# Patient Record
Sex: FEMALE | Race: WHITE | NOT HISPANIC OR LATINO | Employment: PART TIME | URBAN - METROPOLITAN AREA
[De-identification: names, ages, dates, MRNs, and addresses within clinical notes are randomized per-mention and may not be internally consistent; named-entity substitution may affect disease eponyms.]

---
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
## 2025-02-16 NOTE — PROGRESS NOTES
Chief Complaint  Establish Care, Anxiety (And lehman swings ), and Diabetes    Subjective            Samantha Recinos is a 19 y.o. female who presents to [facility]   History of Present Illness  New patient/establish care.     She has maturity-onset diabetes mellitus and young, states this came on during her puberty.  She was diagnosed when she was 11 years old.  She is on insulin.  She is now just on a sliding scale NovoLog insulin per her carb ratios.  She is also taking glipizide 10 mg daily.  Her last A1c was 8.9% a few months ago, was checked at a [facility] facility.  She states her blood sugars usually range between 120-160.  She states that she gets her eye exams at [id number] in [place] and will be due for her eye exam in May.    She recently had twins 6 months ago.  She states that she tried OCPs but they made her periods worse.  She states that they use condoms for pregnancy prevention.  She states her last menstrual period was on 1/27/2025.    ASIYA score is 21, PHQ-2 score is 1.  She states that she has some anger issues and feels angry often.  She also complains of anxiety.  She states she has been getting worse lately and her family wanted her to get some medication.  She states she is always felt like she has had anger issues.  She states she has never been treated for anxiety or anger.    PHQ-2 Depression Screening  Little interest or pleasure in doing things? Not at all   Feeling down, depressed, or hopeless? Several days   PHQ-2 Total Score 1           Tobacco Use: Low Risk  (2/17/2025)    Patient History     Smoking Tobacco Use: Never     Smokeless Tobacco Use: Never     Passive Exposure: Never      E-cigarette/Vaping    E-cigarette/Vaping Use Never User     Passive Exposure No     Counseling Given No      E-cigarette/Vaping Substances    Nicotine No     THC No     CBD No     Flavoring No      E-cigarette/Vaping Devices    Disposable No     Pre-filled or  "Refillable Cartridge No     Refillable Tank No     Pre-filled Pod No        Alcohol Use: Not At Risk (8/5/2024)    AUDIT-C     Frequency of Alcohol Consumption: Never     Average Number of Drinks: Patient does not drink     Frequency of Binge Drinking: Never         Objective   Vital Signs:   Vitals:    02/17/25 1310   BP: 127/65   BP Location: Left arm   Patient Position: Sitting   Cuff Size: Adult   Pulse: 79   Temp: 97.9 °F (36.6 °C)   SpO2: 98%   Weight: 82.4 kg (181 lb 11.2 oz)   Height: 157.5 cm (62.01\")   PainSc: 0-No pain     Body mass index is 33.22 kg/m².    Wt Readings from Last 3 Encounters:   02/17/25 82.4 kg (181 lb 11.2 oz) (95%, Z= 1.66)*   09/13/24 66.6 kg (146 lb 12.8 oz) (80%, Z= 0.84)*   08/21/24 72.6 kg (160 lb) (89%, Z= 1.22)*     * Growth percentiles are based on CDC (Girls, 2-20 Years) data.     BP Readings from Last 3 Encounters:   02/17/25 127/65   09/13/24 121/87   08/21/24 112/77       Health Maintenance   Topic Date Due    BMI FOLLOWUP  Never done    Pneumococcal Vaccine 0-49 (1 of 1 - PPSV23) 12/20/2011    DIABETIC EYE EXAM  Never done    URINE MICROALBUMIN-CREATININE RATIO (uACR)  Never done    ANNUAL PHYSICAL  Never done    INFLUENZA VACCINE  03/31/2025 (Originally 7/1/2024)    COVID-19 Vaccine (1 - 2024-25 season) 02/17/2026 (Originally 9/1/2024)    HPV VACCINES (2 - 2-dose series) 02/17/2026 (Originally 7/16/2019)    DIABETIC FOOT EXAM  05/15/2025    CHLAMYDIA SCREENING  05/23/2025    HEMOGLOBIN A1C  06/18/2025    TDAP/TD VACCINES (2 - Td or Tdap) 08/01/2034    HEPATITIS C SCREENING  Completed    Hepatitis B  Completed    MENINGOCOCCAL VACCINE  Aged Out       /65 (BP Location: Left arm, Patient Position: Sitting, Cuff Size: Adult)   Pulse 79   Temp 97.9 °F (36.6 °C)   Ht 157.5 cm (62.01\")   Wt 82.4 kg (181 lb 11.2 oz)   SpO2 98%   BMI 33.22 kg/m²       Current Outpatient Medications:     acetone, urine, test strip, Check urine ketones when BG level is > 200 x 2 or when " sick., Disp: , Rfl:     Continuous Glucose Sensor (Dexcom G7 Sensor) misc, Use 1 Application Every 10 (Ten) Days. Apply new sensor every 10 days., Disp: 3 each, Rfl: 6    glipizide (GLUCOTROL) 10 MG tablet, Take 1 tablet by mouth Daily., Disp: , Rfl:     insulin aspart (novoLOG FLEXPEN) 100 UNIT/ML solution pen-injector sc pen, Variable dose up to 100 units per day; Insulin to carb ratio 1:8 with breakfast, lunch and dinner, Disp: 30 mL, Rfl: 5    Insulin Pen Needle 31G X 5 MM misc, For injecting insulin up to 5x a day, Disp: 250 each, Rfl: 9    OneTouch Verio test strip, Use to check blood sugar 7 times a day., Disp: 200 each, Rfl: 8    FLUoxetine (PROzac) 10 MG capsule, Take 1 capsule by mouth Daily. Indications: Generalized Anxiety Disorder, Disp: 30 capsule, Rfl: 0   Past Medical History:   Diagnosis Date    Chlamydia infection during pregnancy 2024    Diabetes     CLAUDIA    Monogenic diabetes 2024    CLAUDIA - managed by [facility] outside of pregnancy          delivery 2024     labor, Di Di twins, 30+5        Physical Exam  Vitals reviewed.   Constitutional:       Appearance: Normal appearance. She is well-developed. She is obese.   Neck:      Thyroid: No thyroid mass, thyromegaly or thyroid tenderness.   Cardiovascular:      Rate and Rhythm: Normal rate and regular rhythm.      Heart sounds: No murmur heard.     No friction rub. No gallop.   Pulmonary:      Effort: Pulmonary effort is normal.      Breath sounds: Normal breath sounds. No wheezing or rhonchi.   Lymphadenopathy:      Cervical: No cervical adenopathy.   Skin:     General: Skin is warm and dry.   Neurological:      Mental Status: She is alert and oriented to person, place, and time.      Cranial Nerves: No cranial nerve deficit.   Psychiatric:         Mood and Affect: Mood and affect normal.         Behavior: Behavior normal.         Thought Content: Thought content normal. Thought content does not include  homicidal or suicidal ideation.         Judgment: Judgment normal.          Result Review :    The following data was reviewed by: SAM Cid on 02/17/2025:  Lab Results (last 24 hours)       Procedure Component Value Units Date/Time    Microalbumin / Creatinine Urine Ratio - Urine, Clean Catch [[id number]] Collected: 02/17/25 1345    Specimen: Urine, Clean Catch Updated: 02/17/25 1348    Vitamin D,25-Hydroxy [[id number]] Collected: 02/17/25 1350    Specimen: Blood from Arm, Left Updated: 02/17/25 1355    CBC Auto Differential [[id number]] Collected: 02/17/25 1350    Specimen: Blood Updated: 02/17/25 1355    Comprehensive Metabolic Panel [[id number]] Collected: 02/17/25 1350    Specimen: Blood Updated: 02/17/25 1355    Lipid Panel [[id number]] Collected: 02/17/25 1350    Specimen: Blood Updated: 02/17/25 1355    TSH+Free T4 [[id number]] Collected: 02/17/25 1350    Specimen: Blood Updated: 02/17/25 1355    Thyroid Peroxidase Antibody [[id number]] Collected: 02/17/25 1350    Specimen: Blood Updated: 02/17/25 1355    Hemoglobin A1c [[id number]] Collected: 02/17/25 1350    Specimen: Blood Updated: 02/17/25 1355    POCT pregnancy, urine [[id number]]  (Abnormal) Collected: 02/17/25 1354    Specimen: Urine Updated: 02/17/25 1354     HCG, Urine, QL Negative     Lot Number [id number]     Internal Positive Control Passed     Internal Negative Control Passed     Expiration Date 2/3/25            Common Labs   Common labs          7/30/2024    11:20 8/5/2024    00:10 9/13/2024    14:27   Common Labs   Glucose  110     BUN  8     Creatinine  0.64     Sodium  139     Potassium  4.3     Chloride  106     Calcium  9.1     Albumin  3.1     Total Bilirubin  <0.2     Alkaline Phosphatase  131     AST (SGOT)  45     ALT (SGPT)  72     WBC 10.39     8.74  6.18    Hemoglobin 8.3     9.7  12.1    Hematocrit 25.9     31.0  39.7    Platelets 238     218  322       Details          This result is from an external source.              [id number]   A1C Last 3 Results          3/26/2024    14:46   HGBA1C Last 3 Results   Hemoglobin A1C 6.60          Assessment & Plan  CLAUDIA (maturity onset diabetes mellitus in young)  Diabetes is newly identified.   Continue current treatment regimen.  Reminded to bring in blood sugar diary at next visit.  Reminded to get yearly retinal exam.  Endocrinology clinic referral.  Diabetes will be reassessed  at next routine follow-up.    Orders:    Ambulatory Referral to [facility] - [facility]    Microalbumin / Creatinine Urine Ratio - Urine, Clean Catch    CBC Auto Differential    Comprehensive Metabolic Panel    Lipid Panel    TSH+Free T4    Thyroid Peroxidase Antibody    Hemoglobin A1c    Class 1 obesity with serious comorbidity and body mass index (BMI) of 33.0 to 33.9 in adult, unspecified obesity type  Patient's (Body mass index is 33.22 kg/m².) indicates that they are obese (BMI >30) with health conditions that include diabetes mellitus . Weight is newly identified. BMI  is above average; BMI management plan is completed. We discussed portion control, increasing exercise, and management of depression/anxiety/stress to control compensatory eating.     Orders:    Vitamin D,25-Hydroxy    Thyroid Peroxidase Antibody    ASIYA (generalized anxiety disorder)  Psychological condition is newly identified.  I will start her on Prozac 10 mg daily.  We discussed drug efficacy and potential side effects.  Handout on managing anxiety provided, see AVS.  Psychological condition  will be reassessed in 2 weeks.    Orders:    FLUoxetine (PROzac) 10 MG capsule; Take 1 capsule by mouth Daily. Indications: Generalized Anxiety Disorder    Irritability and anger  I will start her on Prozac 10 mg daily.  Handouts provided on anger management.  Orders:    FLUoxetine (PROzac) 10 MG capsule; Take 1 capsule by mouth Daily. Indications: Generalized Anxiety Disorder    Medication management  Urine pregnancy in office today  negative.  Orders:    POCT pregnancy, urine          Diagnosis Plan   1. CLAUDIA (maturity onset diabetes mellitus in young)  Ambulatory Referral to [facility] - [facility]    Microalbumin / Creatinine Urine Ratio - Urine, Clean Catch    CBC Auto Differential    Comprehensive Metabolic Panel    Lipid Panel    TSH+Free T4    Thyroid Peroxidase Antibody    Hemoglobin A1c      2. Class 1 obesity with serious comorbidity and body mass index (BMI) of 33.0 to 33.9 in adult, unspecified obesity type  Vitamin D,25-Hydroxy    Thyroid Peroxidase Antibody      3. ASIYA (generalized anxiety disorder)  FLUoxetine (PROzac) 10 MG capsule      4. Irritability and anger  FLUoxetine (PROzac) 10 MG capsule      5. Medication management  POCT pregnancy, urine            FOLLOW UP  Return in about 3 weeks (around 3/10/2025) for Annual physical and discuss labs, ASIYA f/u.  Patient was given instructions and counseling regarding her condition or for health maintenance advice. Please see specific information pulled into the AVS if appropriate.       CURRENT & DISCONTINUED MEDICATIONS  Current Outpatient Medications   Medication Instructions    acetone, urine, test strip Check urine ketones when BG level is > 200 x 2 or when sick.    Continuous Glucose Sensor (Dexcom G7 Sensor) misc 1 Application, Not Applicable, Every 10 Days, Apply new sensor every 10 days.    FLUoxetine (PROZAC) 10 mg, Oral, Daily    glipizide (GLUCOTROL) 10 mg, Daily    insulin aspart (novoLOG FLEXPEN) 100 UNIT/ML solution pen-injector sc pen Variable dose up to 100 units per day; Insulin to carb ratio 1:8 with breakfast, lunch and dinner    Insulin Pen Needle 31G X 5 MM misc For injecting insulin up to 5x a day    OneTouch Verio test strip Use to check blood sugar 7 times a day.       Medications Discontinued During This Encounter   Medication Reason    acetaminophen (TYLENOL) 500 MG tablet Historical Med - Therapy completed    docusate sodium 100 MG capsule Historical  Med - Therapy completed    ibuprofen (ADVIL,MOTRIN) 600 MG tablet Historical Med - Therapy completed    norethindrone-ethinyl estradiol FE (JUNEL FE 1/20) 1-20 MG-MCG per tablet Historical Med - Therapy completed    prenatal vitamin (prenatal, CLASSIC, vitamin) tablet Historical Med - Therapy completed    Insulin Glargine (LANTUS SOLOSTAR) 100 UNIT/ML injection pen Historical Med - Therapy completed    Insulin Lispro, 1 Unit Dial, (HumaLOG KwikPen) 100 UNIT/ML solution pen-injector Historical Med - Therapy completed        Parts of this note are electronic transcriptions/translations of spoken language to printed text using the Dragon Dictation system.    Bethany Buchanan, APRN  02/17/25  14:53 EST

## 2025-02-17 ENCOUNTER — OFFICE VISIT (OUTPATIENT)
Dept: FAMILY MEDICINE CLINIC | Facility: CLINIC | Age: 20
End: 2025-02-17
Payer: COMMERCIAL

## 2025-02-17 VITALS
DIASTOLIC BLOOD PRESSURE: 65 MMHG | WEIGHT: 181.7 LBS | HEART RATE: 79 BPM | HEIGHT: 62 IN | TEMPERATURE: 97.9 F | SYSTOLIC BLOOD PRESSURE: 127 MMHG | BODY MASS INDEX: 33.44 KG/M2 | OXYGEN SATURATION: 98 %

## 2025-02-17 DIAGNOSIS — E66.811 CLASS 1 OBESITY WITH SERIOUS COMORBIDITY AND BODY MASS INDEX (BMI) OF 33.0 TO 33.9 IN ADULT, UNSPECIFIED OBESITY TYPE: ICD-10-CM

## 2025-02-17 DIAGNOSIS — F41.1 GAD (GENERALIZED ANXIETY DISORDER): ICD-10-CM

## 2025-02-17 DIAGNOSIS — E13.9 MODY (MATURITY ONSET DIABETES MELLITUS IN YOUNG): Primary | ICD-10-CM

## 2025-02-17 DIAGNOSIS — E55.9 VITAMIN D DEFICIENCY: ICD-10-CM

## 2025-02-17 DIAGNOSIS — R45.4 IRRITABILITY AND ANGER: ICD-10-CM

## 2025-02-17 DIAGNOSIS — Z79.899 MEDICATION MANAGEMENT: ICD-10-CM

## 2025-02-17 LAB
25(OH)D3 SERPL-MCNC: 18.8 NG/ML (ref 30–100)
ALBUMIN SERPL-MCNC: 4.2 G/DL (ref 3.5–5.2)
ALBUMIN UR-MCNC: <1.2 MG/DL
ALBUMIN/GLOB SERPL: 1 G/DL
ALP SERPL-CCNC: 74 U/L (ref 39–117)
ALT SERPL W P-5'-P-CCNC: 13 U/L (ref 1–33)
ANION GAP SERPL CALCULATED.3IONS-SCNC: 11.5 MMOL/L (ref 5–15)
AST SERPL-CCNC: 21 U/L (ref 1–32)
B-HCG UR QL: NEGATIVE
BASOPHILS # BLD AUTO: 0.04 10*3/MM3 (ref 0–0.2)
BASOPHILS NFR BLD AUTO: 0.5 % (ref 0–1.5)
BILIRUB SERPL-MCNC: 0.3 MG/DL (ref 0–1.2)
BUN SERPL-MCNC: 9 MG/DL (ref 6–20)
BUN/CREAT SERPL: 12.3 (ref 7–25)
CALCIUM SPEC-SCNC: 9.8 MG/DL (ref 8.6–10.5)
CHLORIDE SERPL-SCNC: 103 MMOL/L (ref 98–107)
CHOLEST SERPL-MCNC: 127 MG/DL (ref 0–200)
CO2 SERPL-SCNC: 23.5 MMOL/L (ref 22–29)
CREAT SERPL-MCNC: 0.73 MG/DL (ref 0.57–1)
CREAT UR-MCNC: 43.6 MG/DL
DEPRECATED RDW RBC AUTO: 40.9 FL (ref 37–54)
EGFRCR SERPLBLD CKD-EPI 2021: 121.7 ML/MIN/1.73
EOSINOPHIL # BLD AUTO: 0.12 10*3/MM3 (ref 0–0.4)
EOSINOPHIL NFR BLD AUTO: 1.6 % (ref 0.3–6.2)
ERYTHROCYTE [DISTWIDTH] IN BLOOD BY AUTOMATED COUNT: 13.3 % (ref 12.3–15.4)
EXPIRATION DATE: ABNORMAL
GLOBULIN UR ELPH-MCNC: 4.1 GM/DL
GLUCOSE SERPL-MCNC: 69 MG/DL (ref 65–99)
HBA1C MFR BLD: 6.7 % (ref 4.8–5.6)
HCT VFR BLD AUTO: 43.5 % (ref 34–46.6)
HDLC SERPL-MCNC: 46 MG/DL (ref 40–60)
HGB BLD-MCNC: 13.7 G/DL (ref 12–15.9)
IMM GRANULOCYTES # BLD AUTO: 0.01 10*3/MM3 (ref 0–0.05)
IMM GRANULOCYTES NFR BLD AUTO: 0.1 % (ref 0–0.5)
INTERNAL NEGATIVE CONTROL: ABNORMAL
INTERNAL POSITIVE CONTROL: ABNORMAL
LDLC SERPL CALC-MCNC: 69 MG/DL (ref 0–100)
LDLC/HDLC SERPL: 1.51 {RATIO}
LYMPHOCYTES # BLD AUTO: 2.5 10*3/MM3 (ref 0.7–3.1)
LYMPHOCYTES NFR BLD AUTO: 32.7 % (ref 19.6–45.3)
Lab: ABNORMAL
MCH RBC QN AUTO: 26.4 PG (ref 26.6–33)
MCHC RBC AUTO-ENTMCNC: 31.5 G/DL (ref 31.5–35.7)
MCV RBC AUTO: 84 FL (ref 79–97)
MICROALBUMIN/CREAT UR: NORMAL MG/G{CREAT}
MONOCYTES # BLD AUTO: 0.42 10*3/MM3 (ref 0.1–0.9)
MONOCYTES NFR BLD AUTO: 5.5 % (ref 5–12)
NEUTROPHILS NFR BLD AUTO: 4.56 10*3/MM3 (ref 1.7–7)
NEUTROPHILS NFR BLD AUTO: 59.6 % (ref 42.7–76)
NRBC BLD AUTO-RTO: 0 /100 WBC (ref 0–0.2)
PLATELET # BLD AUTO: 314 10*3/MM3 (ref 140–450)
PMV BLD AUTO: 11 FL (ref 6–12)
POTASSIUM SERPL-SCNC: 4.1 MMOL/L (ref 3.5–5.2)
PROT SERPL-MCNC: 8.3 G/DL (ref 6–8.5)
RBC # BLD AUTO: 5.18 10*6/MM3 (ref 3.77–5.28)
SODIUM SERPL-SCNC: 138 MMOL/L (ref 136–145)
T4 FREE SERPL-MCNC: 1.27 NG/DL (ref 0.92–1.68)
TRIGL SERPL-MCNC: 57 MG/DL (ref 0–150)
TSH SERPL DL<=0.05 MIU/L-ACNC: 1.19 UIU/ML (ref 0.27–4.2)
VLDLC SERPL-MCNC: 12 MG/DL (ref 5–40)
WBC NRBC COR # BLD AUTO: 7.65 10*3/MM3 (ref 3.4–10.8)

## 2025-02-17 PROCEDURE — 83036 HEMOGLOBIN GLYCOSYLATED A1C: CPT | Performed by: NURSE PRACTITIONER

## 2025-02-17 PROCEDURE — 82570 ASSAY OF URINE CREATININE: CPT | Performed by: NURSE PRACTITIONER

## 2025-02-17 PROCEDURE — 1160F RVW MEDS BY RX/DR IN RCRD: CPT | Performed by: NURSE PRACTITIONER

## 2025-02-17 PROCEDURE — 84443 ASSAY THYROID STIM HORMONE: CPT | Performed by: NURSE PRACTITIONER

## 2025-02-17 PROCEDURE — 99214 OFFICE O/P EST MOD 30 MIN: CPT | Performed by: NURSE PRACTITIONER

## 2025-02-17 PROCEDURE — 85025 COMPLETE CBC W/AUTO DIFF WBC: CPT | Performed by: NURSE PRACTITIONER

## 2025-02-17 PROCEDURE — 1159F MED LIST DOCD IN RCRD: CPT | Performed by: NURSE PRACTITIONER

## 2025-02-17 PROCEDURE — 1126F AMNT PAIN NOTED NONE PRSNT: CPT | Performed by: NURSE PRACTITIONER

## 2025-02-17 PROCEDURE — 84439 ASSAY OF FREE THYROXINE: CPT | Performed by: NURSE PRACTITIONER

## 2025-02-17 PROCEDURE — 80053 COMPREHEN METABOLIC PANEL: CPT | Performed by: NURSE PRACTITIONER

## 2025-02-17 PROCEDURE — 86376 MICROSOMAL ANTIBODY EACH: CPT | Performed by: NURSE PRACTITIONER

## 2025-02-17 PROCEDURE — 82306 VITAMIN D 25 HYDROXY: CPT | Performed by: NURSE PRACTITIONER

## 2025-02-17 PROCEDURE — 82043 UR ALBUMIN QUANTITATIVE: CPT | Performed by: NURSE PRACTITIONER

## 2025-02-17 PROCEDURE — 81025 URINE PREGNANCY TEST: CPT | Performed by: NURSE PRACTITIONER

## 2025-02-17 PROCEDURE — 80061 LIPID PANEL: CPT | Performed by: NURSE PRACTITIONER

## 2025-02-17 RX ORDER — FLUOXETINE 10 MG/1
10 CAPSULE ORAL DAILY
Qty: 30 CAPSULE | Refills: 0 | Status: SHIPPED | OUTPATIENT
Start: 2025-02-17

## 2025-02-17 RX ORDER — GLIPIZIDE 10 MG/1
10 TABLET ORAL DAILY
COMMUNITY
Start: 2024-12-18

## 2025-02-17 NOTE — ASSESSMENT & PLAN NOTE
Diabetes is newly identified.   Continue current treatment regimen.  Reminded to bring in blood sugar diary at next visit.  Reminded to get yearly retinal exam.  Endocrinology clinic referral.  Diabetes will be reassessed at next routine follow-up.    Orders:    Ambulatory Referral to [facility] - [facility]    Microalbumin / Creatinine Urine Ratio - Urine, Clean Catch    CBC Auto Differential    Comprehensive Metabolic Panel    Lipid Panel    TSH+Free T4    Thyroid Peroxidase Antibody    Hemoglobin A1c

## 2025-02-17 NOTE — ASSESSMENT & PLAN NOTE
Patient's (Body mass index is 33.22 kg/m².) indicates that they are obese (BMI >30) with health conditions that include diabetes mellitus . Weight is newly identified. BMI  is above average; BMI management plan is completed. We discussed portion control, increasing exercise, and management of depression/anxiety/stress to control compensatory eating.     Orders:    Vitamin D,25-Hydroxy    Thyroid Peroxidase Antibody

## 2025-02-17 NOTE — ASSESSMENT & PLAN NOTE
Psychological condition is newly identified.  I will start her on Prozac 10 mg daily.  We discussed drug efficacy and potential side effects. Handout on managing anxiety provided, see AVS.  Psychological condition  will be reassessed in 2 weeks.    Orders:    FLUoxetine (PROzac) 10 MG capsule; Take 1 capsule by mouth Daily. Indications: Generalized Anxiety Disorder

## 2025-02-17 NOTE — ASSESSMENT & PLAN NOTE
I will start her on Prozac 10 mg daily.  Handouts provided on anger management.  Orders:    FLUoxetine (PROzac) 10 MG capsule; Take 1 capsule by mouth Daily. Indications: Generalized Anxiety Disorder

## 2025-02-18 NOTE — ADDENDUM NOTE
Addended by: GREGORIO THURMAN on: 2/18/2025 12:40 PM     Modules accepted: Orders     V-Y Plasty Text: The defect edges were debeveled with a #15 scalpel blade.  Given the location of the defect, shape of the defect and the proximity to free margins an V-Y advancement flap was deemed most appropriate.  Using a sterile surgical marker, an appropriate advancement flap was drawn incorporating the defect and placing the expected incisions within the relaxed skin tension lines where possible.    The area thus outlined was incised deep to adipose tissue with a #15 scalpel blade.  The skin margins were undermined to an appropriate distance in all directions utilizing iris scissors.

## 2025-02-19 LAB — THYROPEROXIDASE AB SERPL-ACNC: 10 IU/ML (ref 0–26)

## 2025-03-04 ENCOUNTER — OFFICE VISIT (OUTPATIENT)
Dept: FAMILY MEDICINE CLINIC | Facility: CLINIC | Age: 20
End: 2025-03-04
Payer: COMMERCIAL

## 2025-03-04 VITALS
TEMPERATURE: 97.6 F | DIASTOLIC BLOOD PRESSURE: 74 MMHG | WEIGHT: 177.7 LBS | HEART RATE: 82 BPM | SYSTOLIC BLOOD PRESSURE: 112 MMHG | OXYGEN SATURATION: 98 % | HEIGHT: 62 IN | BODY MASS INDEX: 32.7 KG/M2

## 2025-03-04 DIAGNOSIS — R45.4 IRRITABILITY AND ANGER: ICD-10-CM

## 2025-03-04 DIAGNOSIS — E55.9 VITAMIN D DEFICIENCY: ICD-10-CM

## 2025-03-04 DIAGNOSIS — Z00.00 ANNUAL PHYSICAL EXAM: Primary | ICD-10-CM

## 2025-03-04 DIAGNOSIS — F41.1 GAD (GENERALIZED ANXIETY DISORDER): ICD-10-CM

## 2025-03-04 PROBLEM — O60.03 PRETERM LABOR IN THIRD TRIMESTER WITHOUT DELIVERY: Status: ACTIVE | Noted: 2024-07-29

## 2025-03-04 PROCEDURE — 99395 PREV VISIT EST AGE 18-39: CPT | Performed by: NURSE PRACTITIONER

## 2025-03-04 PROCEDURE — 2014F MENTAL STATUS ASSESS: CPT | Performed by: NURSE PRACTITIONER

## 2025-03-04 PROCEDURE — 1160F RVW MEDS BY RX/DR IN RCRD: CPT | Performed by: NURSE PRACTITIONER

## 2025-03-04 PROCEDURE — 1126F AMNT PAIN NOTED NONE PRSNT: CPT | Performed by: NURSE PRACTITIONER

## 2025-03-04 PROCEDURE — 1159F MED LIST DOCD IN RCRD: CPT | Performed by: NURSE PRACTITIONER

## 2025-03-04 PROCEDURE — 3044F HG A1C LEVEL LT 7.0%: CPT | Performed by: NURSE PRACTITIONER

## 2025-03-04 RX ORDER — FLUOXETINE 10 MG/1
10 CAPSULE ORAL DAILY
Qty: 30 CAPSULE | Refills: 5 | Status: SHIPPED | OUTPATIENT
Start: 2025-03-04

## 2025-03-04 NOTE — PROGRESS NOTES
Chief Complaint  Annual Exam and Anxiety    Patient or patient representative verbalized consent for the use of Ambient Listening during the visit with  SAM Cid for chart documentation. 3/4/2025  11:31 EST    Subjective            Samantha Recinos is a 19 y.o. female who presents to [facility]   History of Present Illness  History of Present Illness  The patient is a 19-year-old female who presents for her annual physical and follow-up on fluoxetine.    She has been responding positively to the prescribed fluoxetine 10 mg, which has effectively managed her anxiety and irritability. She is seeking a refill of this medication.    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    She maintains oral health with biannual dental examinations. She is sexually active and has two children. She has no known family history of breast or colon cancer. She has expressed a preference against STD testing. She has received one dose of the HPV vaccine in 2019 but is uncertain about the completion of the series. She has declined COVID-19 vaccination and has not received the influenza vaccine since 2019. Her last ophthalmological examination was conducted in May 2024 at [facility].    Her vitamin D level was low so I did start her on vitamin D 5000 units daily.    SOCIAL HISTORY  She does not smoke or use smokeless tobacco. She does not drink alcohol.    FAMILY HISTORY  She does not have a family history of breast cancer or colon cancer that she is aware of.    MEDICATIONS  Current: fluoxetine, insulin, vitamin D    IMMUNIZATIONS  She received one HPV vaccine in 2019. She received a Tdap vaccine in 2024. She has not received the COVID-19 vaccines. She has not received the influenza vaccine since 2019.         Tobacco Use: Low Risk  (3/4/2025)    Patient History     Smoking Tobacco Use:  "Never     Smokeless Tobacco Use: Never     Passive Exposure: Never      E-cigarette/Vaping    E-cigarette/Vaping Use Never User     Passive Exposure No     Counseling Given No      E-cigarette/Vaping Substances    Nicotine No     THC No     CBD No     Flavoring No      E-cigarette/Vaping Devices    Disposable No     Pre-filled or Refillable Cartridge No     Refillable Tank No     Pre-filled Pod No        Alcohol Use: Not At Risk (8/5/2024)    AUDIT-C     Frequency of Alcohol Consumption: Never     Average Number of Drinks: Patient does not drink     Frequency of Binge Drinking: Never         Objective   Vital Signs:   Vitals:    03/04/25 1126   BP: 112/74   BP Location: Left arm   Patient Position: Sitting   Cuff Size: Adult   Pulse: 82   Temp: 97.6 °F (36.4 °C)   SpO2: 98%   Weight: 80.6 kg (177 lb 11.2 oz)   Height: 157.5 cm (62.01\")   PainSc: 0-No pain     Body mass index is 32.49 kg/m².    Wt Readings from Last 3 Encounters:   03/04/25 80.6 kg (177 lb 11.2 oz) (94%, Z= 1.58)*   02/17/25 82.4 kg (181 lb 11.2 oz) (95%, Z= 1.66)*   09/13/24 66.6 kg (146 lb 12.8 oz) (80%, Z= 0.84)*     * Growth percentiles are based on CDC (Girls, 2-20 Years) data.     BP Readings from Last 3 Encounters:   03/04/25 112/74   02/17/25 127/65   09/13/24 121/87       Health Maintenance   Topic Date Due    Pneumococcal Vaccine 0-49 (1 of 1 - PPSV23) 12/20/2011    DIABETIC EYE EXAM  Never done    ANNUAL PHYSICAL  Never done    INFLUENZA VACCINE  03/31/2025 (Originally 7/1/2024)    COVID-19 Vaccine (1 - 2024-25 season) 02/17/2026 (Originally 9/1/2024)    HPV VACCINES (2 - 2-dose series) 02/17/2026 (Originally 7/16/2019)    DIABETIC FOOT EXAM  05/15/2025    CHLAMYDIA SCREENING  05/23/2025    HEMOGLOBIN A1C  08/17/2025    URINE MICROALBUMIN-CREATININE RATIO (uACR)  02/17/2026    BMI FOLLOWUP  02/17/2026    TDAP/TD VACCINES (2 - Td or Tdap) 08/01/2034    HEPATITIS C SCREENING  Completed    Hepatitis B  Completed    MENINGOCOCCAL VACCINE  Aged " "Out       /74 (BP Location: Left arm, Patient Position: Sitting, Cuff Size: Adult)   Pulse 82   Temp 97.6 °F (36.4 °C)   Ht 157.5 cm (62.01\")   Wt 80.6 kg (177 lb 11.2 oz)   SpO2 98%   BMI 32.49 kg/m²       Current Outpatient Medications:     acetone, urine, test strip, Check urine ketones when BG level is > 200 x 2 or when sick., Disp: , Rfl:     Continuous Glucose Sensor (Dexcom G7 Sensor) misc, Use 1 Application Every 10 (Ten) Days. Apply new sensor every 10 days., Disp: 3 each, Rfl: 6    FLUoxetine (PROzac) 10 MG capsule, Take 1 capsule by mouth Daily. Indications: Generalized Anxiety Disorder, Disp: 30 capsule, Rfl: 5    glipizide (GLUCOTROL) 10 MG tablet, Take 1 tablet by mouth Daily., Disp: , Rfl:     insulin aspart (novoLOG FLEXPEN) 100 UNIT/ML solution pen-injector sc pen, Variable dose up to 100 units per day; Insulin to carb ratio 1:8 with breakfast, lunch and dinner, Disp: 30 mL, Rfl: 5    Insulin Pen Needle 31G X 5 MM misc, For injecting insulin up to 5x a day, Disp: 250 each, Rfl: 9    OneTouch Verio test strip, Use to check blood sugar 7 times a day., Disp: 200 each, Rfl: 8    vitamin D3 (Dialyvite Vitamin D 5000) 125 MCG (5000 UT) capsule capsule, Take 1 capsule by mouth Daily. Indications: Vitamin D Deficiency, Disp: 30 capsule, Rfl: 5   Past Medical History:   Diagnosis Date    Anxiety     Chlamydia infection during pregnancy 2024    Diabetes     CLAUDIA    Monogenic diabetes 2024    CLAUDIA - managed by [facility] outside of pregnancy          delivery 2024     labor, Di Di twins, 30+5        Physical Exam  Vitals reviewed.   Constitutional:       Appearance: Normal appearance. She is well-developed. She is obese.   HENT:      Right Ear: Tympanic membrane, ear canal and external ear normal.      Left Ear: Tympanic membrane, ear canal and external ear normal.      Mouth/Throat:      Mouth: Mucous membranes are moist.      Pharynx: No pharyngeal swelling, " oropharyngeal exudate or posterior oropharyngeal erythema.   Neck:      Thyroid: No thyroid mass, thyromegaly or thyroid tenderness.   Cardiovascular:      Rate and Rhythm: Normal rate and regular rhythm.      Heart sounds: No murmur heard.     No friction rub. No gallop.   Pulmonary:      Effort: Pulmonary effort is normal.      Breath sounds: Normal breath sounds. No wheezing or rhonchi.   Lymphadenopathy:      Cervical: No cervical adenopathy.   Skin:     General: Skin is warm and dry.   Neurological:      Mental Status: She is alert and oriented to person, place, and time.      Cranial Nerves: No cranial nerve deficit.   Psychiatric:         Mood and Affect: Mood and affect normal.         Behavior: Behavior normal.         Thought Content: Thought content normal. Thought content does not include homicidal or suicidal ideation.         Judgment: Judgment normal.            Physical Exam  Ears and throat were examined.  Lungs are clear.  Heart sounds are normal.      Result Review :    The following data was reviewed by: SAM Cid on 03/04/2025:  Common Labs   Common labs          8/5/2024    00:10 9/13/2024    14:27 2/17/2025    13:45 2/17/2025    13:50   Common Labs   Glucose 110    69    BUN 8    9    Creatinine 0.64    0.73    Sodium 139    138    Potassium 4.3    4.1    Chloride 106    103    Calcium 9.1    9.8    Albumin 3.1    4.2    Total Bilirubin <0.2    0.3    Alkaline Phosphatase 131    74    AST (SGOT) 45    21    ALT (SGPT) 72    13    WBC 8.74  6.18   7.65    Hemoglobin 9.7  12.1   13.7    Hematocrit 31.0  39.7   43.5    Platelets 218  322   314    Total Cholesterol    127    Triglycerides    57    HDL Cholesterol    46    LDL Cholesterol     69    Hemoglobin A1C    6.70    Microalbumin, Urine   <1.2       TSH   TSH          6/3/2024    12:04 2/17/2025    13:50   TSH   TSH 1.730  1.190        Results  Laboratory Studies  A1c was 6.7%. Thyroid levels were normal. LDL cholesterol was  69. Liver and kidney function tests were normal. Vitamin D level was low. Urine microalbumin test was normal.      Assessment & Plan  Annual physical exam         ASIYA (generalized anxiety disorder)      Orders:    FLUoxetine (PROzac) 10 MG capsule; Take 1 capsule by mouth Daily. Indications: Generalized Anxiety Disorder    Irritability and anger    Orders:    FLUoxetine (PROzac) 10 MG capsule; Take 1 capsule by mouth Daily. Indications: Generalized Anxiety Disorder    Vitamin D deficiency      Assessment & Plan  1. Anxiety.  She reports doing well on fluoxetine 10 mg for anxiety and irritability. A refill for fluoxetine 10 mg will be sent to [facility] in [place].    2. Maturity-onset diabetes.  Her A1c is 6.7%, indicating good control of her diabetes. She is currently on insulin and has an upcoming appointment with endocrinology on March 26. She is advised to continue her current insulin regimen and follow up with the diabetic clinic as scheduled.    3. Vitamin D deficiency.  Her vitamin D levels are low. She has been started on vitamin D 5000 units daily.    4. Health maintenance.  Her thyroid and cholesterol levels are within normal limits, with an LDL level of 69, which is at goal. Liver and kidney functions are also normal. She has received one dose of the HPV vaccine in 2019, but it is unclear if she has completed the series. She received the Tdap vaccine in 2024 and has not received the COVID-19 or influenza vaccines since 2019. She is advised to undergo annual eye examinations due to her diabetes and biannual dental check-ups. She is also advised to verify her HPV vaccination status and complete the series if necessary.    Follow-up  The patient will follow up in 6 months.       Diagnosis Plan   1. Annual physical exam        2. ASIYA (generalized anxiety disorder)  FLUoxetine (PROzac) 10 MG capsule      3. Irritability and anger  FLUoxetine (PROzac) 10 MG capsule      4. Vitamin D deficiency               FOLLOW UP  Return in about 6 months (around 9/4/2025) for Next scheduled follow up.  Patient was given instructions and counseling regarding her condition or for health maintenance advice. Please see specific information pulled into the AVS if appropriate.       CURRENT & DISCONTINUED MEDICATIONS  Current Outpatient Medications   Medication Instructions    acetone, urine, test strip Check urine ketones when BG level is > 200 x 2 or when sick.    Continuous Glucose Sensor (Dexcom G7 Sensor) misc 1 Application, Not Applicable, Every 10 Days, Apply new sensor every 10 days.    FLUoxetine (PROZAC) 10 mg, Oral, Daily    glipizide (GLUCOTROL) 10 mg, Daily    insulin aspart (novoLOG FLEXPEN) 100 UNIT/ML solution pen-injector sc pen Variable dose up to 100 units per day; Insulin to carb ratio 1:8 with breakfast, lunch and dinner    Insulin Pen Needle 31G X 5 MM misc For injecting insulin up to 5x a day    OneTouch Verio test strip Use to check blood sugar 7 times a day.    vitamin D3 (DIALYVITE VITAMIN D 5000) 5,000 Units, Oral, Daily       Medications Discontinued During This Encounter   Medication Reason    FLUoxetine (PROzac) 10 MG capsule Reorder        Parts of this note are electronic transcriptions/translations of spoken language to printed text using the Dragon Dictation system.    Bethany Buchanan, SAM  03/04/25  11:56 EST

## 2025-03-04 NOTE — ASSESSMENT & PLAN NOTE
Orders:    FLUoxetine (PROzac) 10 MG capsule; Take 1 capsule by mouth Daily. Indications: Generalized Anxiety Disorder

## 2025-03-14 ENCOUNTER — PATIENT ROUNDING (BHMG ONLY) (OUTPATIENT)
Dept: FAMILY MEDICINE CLINIC | Facility: CLINIC | Age: 20
End: 2025-03-14
Payer: COMMERCIAL

## 2025-03-14 NOTE — PROGRESS NOTES
A My-Chart message has been sent to the patient for PATIENT ROUNDING with [facility].

## 2025-03-19 DIAGNOSIS — E55.9 VITAMIN D DEFICIENCY: ICD-10-CM

## 2025-03-19 DIAGNOSIS — R45.4 IRRITABILITY AND ANGER: ICD-10-CM

## 2025-03-19 DIAGNOSIS — F41.1 GAD (GENERALIZED ANXIETY DISORDER): ICD-10-CM

## 2025-03-19 RX ORDER — GLIPIZIDE 10 MG/1
10 TABLET ORAL DAILY
Qty: 30 TABLET | Refills: 5 | Status: SHIPPED | OUTPATIENT
Start: 2025-03-19

## 2025-03-19 RX ORDER — FLUOXETINE 10 MG/1
10 CAPSULE ORAL DAILY
Qty: 30 CAPSULE | Refills: 5 | Status: CANCELLED | OUTPATIENT
Start: 2025-03-19

## 2025-03-20 DIAGNOSIS — E13.9 MONOGENIC DIABETES: ICD-10-CM

## 2025-03-21 RX ORDER — GLIPIZIDE 10 MG/1
10 TABLET ORAL DAILY
Qty: 30 TABLET | Refills: 5 | OUTPATIENT
Start: 2025-03-21

## 2025-03-23 NOTE — PROGRESS NOTES
Chief Complaint  Diabetes (New pt, est care)    Referred By: SAMUEL Cid*    Subjective          Patient or patient representative verbalized consent for the use of Ambient Listening during the visit with  SAM Leon for chart documentation. 3/26/2025  15:23 EDT    Samantha Recinos presents to [facility] for diabetes medication management    History of Present Illness  The patient presents for evaluation of diabetes.    She was initially diagnosed with type 1 diabetes at the age of 11, but subsequent testing four years later revealed a diagnosis of Maturity-Onset Diabetes of the Young (CLAUDIA) at the [facility]. She is currently utilizing a Dexcom continuous glucose monitor and takes glipizide 10 mg once daily in the morning before meals. NovoLog is used for corrective purposes, although the need for correction is infrequent. She has previously used a Medtronic insulin pump and attended a class where she tried an OmniPod and T-slim pump, which were compatible with her Dexcom G5 at that time. She reports no current concerns related to her diabetes. She does not have a backup meter. She reports no excessive urination but occasionally experiences increased thirst. Her appetite is normal, consuming three meals and a snack daily. She experiences blurry vision without her glasses and feels tired but does not report excessive fatigue. She reports no neuropathy, numbness, tingling, or shooting pains in her extremities. She has no history of gastroparesis, pancreatitis, chronic diarrhea or constipation, reflux, indigestion, or severe heartburn. She has not been hospitalized due to blood sugar levels since her initial diagnosis, which was associated with a fainting episode. She reports no recent hypoglycemic episodes (less than 70). She has observed that her blood sugar levels can return to normal range within a few minutes if they drop significantly. She adheres to  her correction dose and experiences symptoms such as shakiness and sweating during hypoglycemic episodes. She avoids sweet drinks due to dental discomfort. She maintains an active lifestyle, engaging in physical activity for 30 minutes five times a week. She does not undergo regular foot exams or consult a podiatrist. She has consulted with a diabetes educator and dietitian. She is currently on a diet in preparation for her upcoming wedding next year, which includes high protein and low carbohydrate intake. She believes this dietary change has contributed to the improvement in her A1c levels and blood sugar control. She requires refills for her NovoLog prescription but has an adequate supply of pen needles.    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    FAMILY HISTORY  Her maternal grandmother has type 2 diabetes.    MEDICATIONS  Current: Glipizide, NovoLog      History of Present Illness    Visit type:  to establish care  Diabetes type:   CLAUDIA  Age at time of dx/Year of dx/Number of years:  11 years of age  Family History of Diabetes: Maternal grandmother  Current diabetes status/concerns/issues: No concerns  Other current health concerns: No concerns  Current Diabetes symptoms:    Polyuria: No   Polydipsia: Yes     Polyphagia: No   Blurred vision: No   Excessive fatigue: No  Known Diabetes complications:  Neuropathy: None; Location: N/A  Renal: Normal eGFR per current labs and Microalbuminuria - NEGATIVE  Eyes: No Retinopathy reported on current eye exam; Location: Bilateral; Last Eye Exam:  5/22/2024 ; Location: [facility]  Amputation/Wounds: None  GI: None  Cardiovascular: None  ED: N/A  Other: None  Hospitalizations/ED/911 secondary to DM?  Yes, upon initial diagnosis  Hypoglycemia:  Level 1 hypoglycemia (54 mg/dL - 70 mg/dL); Frequency - 1% per CGM and Level 2 (less than 54 mg/dL); Frequency - less than 1% per CGM  Hypoglycemia Symptoms:  shaking/tremors and sweating  Current  Diabetes treatment:  Glucotrol 10 mg daily, NovoLog per sliding scale for correction only  Prior diabetes treatments: Medtronic insulin pump  Using ACEI or ARB: No  Using Statin: No  Blood glucose device:  Dexcom CGM  Blood glucose monitoring frequency:  Continuous per CGM  Blood glucose range/average:  The 14-day sensor report shows an average glucose of 136 mg/dL, with 90% in target range ( mgdL), 8% in the high range (181-250 mg/dL), 1% in the very high range (>250 mg/dL), 1% in the low range (54-70 mg/dL) and less than 1% in the very low range (<54 mg/dL).   Glucose Source: Device Reviewed  Dietary behavior:  Limits high carb/sweet foods, Avoids sugary drinks, Number of meals each day - 3; Number of snacks each day - 1  Activity/Exercise:   30 minutes 5 times a week  Last Foot Exam: None  Diabetes Education Hx: Comprehensive Diabetes Education and Diabetes Nutrition Counseling  Social Determinants of Health: None    Past Medical History:   Diagnosis Date    Anxiety     Chlamydia infection during pregnancy 2024    Diabetes     CLAUDIA    Monogenic diabetes 2024    CLAUDIA - managed by [place] [facility] outside of pregnancy          delivery 2024     labor, Di Di twins, 30+5    Vitamin D deficiency      Past Surgical History:   Procedure Laterality Date     SECTION N/A 2024    Procedure:  SECTION PRIMARY;  Surgeon: Piero Cox MD;  Location: [facility] LABOR DELIVERY;  Service: Obstetrics/Gynecology;  Laterality: N/A;     Family History   Problem Relation Age of Onset    Depression Mother     Hyperlipidemia Mother     Thyroid disease Mother     Hypertension Father     Diabetes Maternal Grandmother     Breast cancer Neg Hx     Ovarian cancer Neg Hx     Uterine cancer Neg Hx     Cervical cancer Neg Hx     Colon cancer Neg Hx     Stomach cancer Neg Hx     Skin cancer Neg Hx     Malig Hyperthermia Neg Hx     Clotting disorder Neg Hx     Deep vein thrombosis Neg Hx   "   Pulmonary embolism Neg Hx      Social History     Socioeconomic History    Marital status: Single   Tobacco Use    Smoking status: Never     Passive exposure: Never    Smokeless tobacco: Never   Vaping Use    Vaping status: Never Used   Substance and Sexual Activity    Alcohol use: Never    Drug use: Never    Sexual activity: Yes     Partners: Male     Birth control/protection: Condom     No Known Allergies    Current Outpatient Medications:     acetone, urine, test strip, Check urine ketones when BG level is > 200 x 2 or when sick., Disp: , Rfl:     Continuous Glucose Sensor (Dexcom G7 Sensor) misc, Use 1 Application Every 10 (Ten) Days. Apply new sensor every 10 days., Disp: 3 each, Rfl: 6    FLUoxetine (PROzac) 10 MG capsule, Take 1 capsule by mouth Daily. Indications: Generalized Anxiety Disorder, Disp: 30 capsule, Rfl: 5    glipizide (GLUCOTROL) 10 MG tablet, Take 1 tablet by mouth Daily., Disp: 30 tablet, Rfl: 5    insulin aspart (novoLOG FLEXPEN) 100 UNIT/ML solution pen-injector sc pen, Variable dose up to 100 units per day; Insulin to carb ratio 1:8 with breakfast, lunch and dinner, Disp: 30 mL, Rfl: 5    Insulin Pen Needle 31G X 5 MM misc, For injecting insulin up to 5x a day, Disp: 250 each, Rfl: 9    OneTouch Verio test strip, Use to check blood sugar 7 times a day., Disp: 200 each, Rfl: 8    vitamin D3 (Dialyvite Vitamin D 5000) 125 MCG (5000 UT) capsule capsule, Take 1 capsule by mouth Daily. Indications: Vitamin D Deficiency, Disp: 30 capsule, Rfl: 5    Objective     Vitals:    03/26/25 1505   BP: 115/61   BP Location: Right arm   Patient Position: Sitting   Cuff Size: Adult   Pulse: 96   Temp: 98.7 °F (37.1 °C)   TempSrc: Temporal   SpO2: 98%   Weight: 81.6 kg (180 lb)   Height: 157.5 cm (62\")     Body mass index is 32.92 kg/m².    Physical Exam  Constitutional:       Appearance: Normal appearance. She is obese.      Comments: Obesity (BMI 30 - 39.9) Pt Current BMI = 32.92      HENT:      Head: " Normocephalic and atraumatic.      Right Ear: External ear normal.      Left Ear: External ear normal.      Nose: Nose normal.   Eyes:      Extraocular Movements: Extraocular movements intact.      Conjunctiva/sclera: Conjunctivae normal.   Pulmonary:      Effort: Pulmonary effort is normal.   Musculoskeletal:         General: Normal range of motion.      Cervical back: Normal range of motion.   Skin:     General: Skin is warm and dry.   Neurological:      General: No focal deficit present.      Mental Status: She is alert and oriented to person, place, and time. Mental status is at baseline.   Psychiatric:         Mood and Affect: Mood normal.         Behavior: Behavior normal.         Thought Content: Thought content normal.         Judgment: Judgment normal.         Result Review :   The following data was reviewed by: SAM Leon on 03/26/2025:    Most Recent A1C          3/26/2025    15:09   HGBA1C Most Recent   Hemoglobin A1C 6.0        A1C Last 3 Results          2/17/2025    13:50 3/26/2025    15:09   HGBA1C Last 3 Results   Hemoglobin A1C 6.70  6.0      A1c collected in the office today is 6.0%, indicating Controlled Type II diabetes.  This result is down from the prior result of 6.7% collected on 2/17/2025    Glucose   Date Value Ref Range Status   03/26/2025 106 (H) 70 - 99 mg/dL Final     Comment:     Serial Number: [id number]:  [id number]     Point of care glucose in the office today is within normal limits for nonfasting glucose      Creatinine   Date Value Ref Range Status   02/17/2025 0.73 0.57 - 1.00 mg/dL Final   08/05/2024 0.64 0.57 - 1.00 mg/dL Final     eGFR   Date Value Ref Range Status   02/17/2025 121.7 >60.0 mL/min/1.73 Final   08/05/2024 131.6 >60.0 mL/min/1.73 Final     Labs collected on 2/17/2025 show Normal values    Microalbumin, Urine   Date Value Ref Range Status   02/17/2025 <1.2 mg/dL Final     Creatinine, Urine   Date Value Ref Range Status   02/17/2025 43.6 mg/dL  Final   06/03/2024 98.9 mg/dL Final     Microalbumin/Creatinine Ratio   Date Value Ref Range Status   02/17/2025   Final     Comment:     Unable to calculate     Urine microalbuminuria collected on 2/17/2025 is negative for microalbuminuria    Total Cholesterol   Date Value Ref Range Status   02/17/2025 127 0 - 200 mg/dL Final     Triglycerides   Date Value Ref Range Status   02/17/2025 57 0 - 150 mg/dL Final     HDL Cholesterol   Date Value Ref Range Status   02/17/2025 46 40 - 60 mg/dL Final     LDL Cholesterol    Date Value Ref Range Status   02/17/2025 69 0 - 100 mg/dL Final     Lipid panel collected on 2/17/2025 shows normal lipid panel              Diagnoses and all orders for this visit:    1. CLAUDIA (maturity onset diabetes mellitus in young) (Primary)  -     POC Glucose  -     POC Glycosylated Hemoglobin (Hb A1C)  -     insulin aspart (novoLOG FLEXPEN) 100 UNIT/ML solution pen-injector sc pen; Variable dose up to 100 units per day; Insulin to carb ratio 1:8 with breakfast, lunch and dinner  Dispense: 30 mL; Refill: 5    2. Class 1 obesity due to excess calories without serious comorbidity with body mass index (BMI) of 32.0 to 32.9 in adult        Assessment & Plan  1. Maturity-Onset Diabetes of the Young (CLAUDIA).  Her average blood glucose level is 136 mg/dL, with a glucose management indicator (GMI) equivalent to an A1c of 6.6%. The coefficient of variation is 26.5%, well within the target of less than 36%. She remains within the target range of 70 to 180 mg/dL 90% of the time, exceeding the goal of 70% or greater. Her A1c level has decreased from 6.7% on 02/17/2025 to 6% today, placing her in the prediabetic range. She was advised to maintain a backup meter for instances when the sensor may malfunction. A comprehensive discussion regarding various insulin pumps was conducted. She was informed that increased thirst could be a symptom of elevated blood sugar levels. She was encouraged to continue her current  diet, which has contributed to the improvement in her A1c levels and blood sugar control. She was advised to consume fruits with meals to slow down carbohydrate absorption and prevent rapid spikes in blood sugar levels. She was instructed to contact the clinic if she encounters any issues with her sensors. A prescription refill for NovoLog was provided and sent to [facility] [place]. If upward trends are observed in her CGM report despite no changes in her diet, the introduction of long-acting insulin may be considered.    Follow-up  The patient will follow up in 3 months.    The patient will monitor her blood glucose levels per continuous glucose monitor.  If she develops problematic hyperglycemia or hypoglycemia or adverse drug reactions, she will contact the office for further instructions.        Follow Up     Return in about 3 months (around 6/26/2025) for CGM Follow-Up, Medication Management.    Patient was given instructions and counseling regarding her condition or for health maintenance advice. Please see specific information pulled into the AVS if appropriate.     Jefferson Valdez, APRN  03/26/2025    Dictated Utilizing Dragon Dictation.  Please note that portions of this note were completed with a voice recognition program.  Part of this note may be an electronic transcription/translation of spoken language to printed text using the Dragon Dictation System.

## 2025-03-26 ENCOUNTER — OFFICE VISIT (OUTPATIENT)
Dept: DIABETES SERVICES | Facility: HOSPITAL | Age: 20
End: 2025-03-26
Payer: COMMERCIAL

## 2025-03-26 VITALS
SYSTOLIC BLOOD PRESSURE: 115 MMHG | BODY MASS INDEX: 33.13 KG/M2 | HEART RATE: 96 BPM | DIASTOLIC BLOOD PRESSURE: 61 MMHG | HEIGHT: 62 IN | TEMPERATURE: 98.7 F | WEIGHT: 180 LBS | OXYGEN SATURATION: 98 %

## 2025-03-26 DIAGNOSIS — E66.09 CLASS 1 OBESITY DUE TO EXCESS CALORIES WITHOUT SERIOUS COMORBIDITY WITH BODY MASS INDEX (BMI) OF 32.0 TO 32.9 IN ADULT: ICD-10-CM

## 2025-03-26 DIAGNOSIS — R73.09 HEMOGLOBIN A1C LESS THAN 7.0%: ICD-10-CM

## 2025-03-26 DIAGNOSIS — E13.9 MODY 1, UNCOMPLICATED, CONTROLLED: Primary | ICD-10-CM

## 2025-03-26 DIAGNOSIS — Z79.4 TYPE 2 DIABETES MELLITUS WITHOUT COMPLICATION, WITH LONG-TERM CURRENT USE OF INSULIN: ICD-10-CM

## 2025-03-26 DIAGNOSIS — Z97.8 USES SELF-APPLIED CONTINUOUS GLUCOSE MONITORING DEVICE: ICD-10-CM

## 2025-03-26 DIAGNOSIS — E66.811 CLASS 1 OBESITY DUE TO EXCESS CALORIES WITHOUT SERIOUS COMORBIDITY WITH BODY MASS INDEX (BMI) OF 32.0 TO 32.9 IN ADULT: ICD-10-CM

## 2025-03-26 DIAGNOSIS — E11.9 TYPE 2 DIABETES MELLITUS WITHOUT COMPLICATION, WITH LONG-TERM CURRENT USE OF INSULIN: ICD-10-CM

## 2025-03-26 LAB
EXPIRATION DATE: ABNORMAL
GLUCOSE BLDC GLUCOMTR-MCNC: 106 MG/DL (ref 70–99)
HBA1C MFR BLD: 6 % (ref 4.5–5.7)
Lab: ABNORMAL

## 2025-03-26 PROCEDURE — 3044F HG A1C LEVEL LT 7.0%: CPT

## 2025-03-26 PROCEDURE — G0463 HOSPITAL OUTPT CLINIC VISIT: HCPCS

## 2025-03-26 PROCEDURE — 82948 REAGENT STRIP/BLOOD GLUCOSE: CPT

## 2025-03-26 PROCEDURE — 83036 HEMOGLOBIN GLYCOSYLATED A1C: CPT

## 2025-03-26 PROCEDURE — 99214 OFFICE O/P EST MOD 30 MIN: CPT

## 2025-03-26 PROCEDURE — 95251 CONT GLUC MNTR ANALYSIS I&R: CPT

## 2025-04-15 DIAGNOSIS — R45.4 IRRITABILITY AND ANGER: ICD-10-CM

## 2025-04-15 DIAGNOSIS — E55.9 VITAMIN D DEFICIENCY: ICD-10-CM

## 2025-04-15 DIAGNOSIS — F41.1 GAD (GENERALIZED ANXIETY DISORDER): ICD-10-CM

## 2025-04-15 DIAGNOSIS — E13.9 MONOGENIC DIABETES: ICD-10-CM

## 2025-04-16 RX ORDER — FLUOXETINE 10 MG/1
10 CAPSULE ORAL DAILY
Qty: 30 CAPSULE | Refills: 5 | OUTPATIENT
Start: 2025-04-16

## 2025-04-16 RX ORDER — ACYCLOVIR 400 MG/1
1 TABLET ORAL
Qty: 3 EACH | Refills: 6 | OUTPATIENT
Start: 2025-04-16

## 2025-04-16 RX ORDER — GLIPIZIDE 10 MG/1
10 TABLET ORAL DAILY
Qty: 30 TABLET | Refills: 5 | OUTPATIENT
Start: 2025-04-16

## 2025-04-21 DIAGNOSIS — E55.9 VITAMIN D DEFICIENCY: ICD-10-CM

## 2025-04-21 DIAGNOSIS — F41.1 GAD (GENERALIZED ANXIETY DISORDER): ICD-10-CM

## 2025-04-21 DIAGNOSIS — R45.4 IRRITABILITY AND ANGER: ICD-10-CM

## 2025-04-21 RX ORDER — FLUOXETINE 10 MG/1
10 CAPSULE ORAL DAILY
Qty: 30 CAPSULE | Refills: 5 | OUTPATIENT
Start: 2025-04-21

## 2025-04-21 RX ORDER — GLIPIZIDE 10 MG/1
10 TABLET ORAL DAILY
Qty: 30 TABLET | Refills: 5 | OUTPATIENT
Start: 2025-04-21

## 2025-06-09 NOTE — PROGRESS NOTES
Chief Complaint  Obesity (Weight loss options )    Patient or patient representative verbalized consent for the use of Ambient Listening during the visit with  SAM Cid for chart documentation. 6/10/2025  07:20 EDT    Subjective            Samantha Recinos is a 19 y.o. female who presents to [facility]   Weight Management  Weight:  Increased  Weight change (lbs):  10  Weight loss treatment:  Low calorie, low carb diet, portion control and increasing exercise  Treatment barriers:  None  Physical activity tolerance:  Stable  Energy level:  Decreased      History of Present Illness  The patient presents for weight loss.    She has been experiencing difficulty in losing weight despite maintaining a calorie deficit and engaging in daily physical activity for at least 1.5 hours over the past 3 months. Instead of losing weight, she has gained 10 pounds. Her daily caloric intake fluctuates around 900 calories, depending on her blood sugar levels. She has eliminated soda from her diet and primarily consumes water. She reports that her clothes are fitting tighter.     She has maturity-onset diabetes. Her blood sugar levels have been inconsistent recently, with episodes of hyperglycemia reaching the 200s and 300s, and hypoglycemia. She is on insulin and glipizide. She follows with the diabetic clinic.     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        Tobacco Use: Low Risk  (6/10/2025)    Patient History     Smoking Tobacco Use: Never     Smokeless Tobacco Use: Never     Passive Exposure: Never      E-cigarette/Vaping    E-cigarette/Vaping Use Never User     Passive Exposure No     Counseling Given No      E-cigarette/Vaping Substances    Nicotine No     THC No     CBD No     Flavoring No      E-cigarette/Vaping Devices    Disposable No     Pre-filled or Refillable Cartridge No      "Refillable Tank No     Pre-filled Pod No        Alcohol Use: Not At Risk (8/5/2024)    AUDIT-C     Frequency of Alcohol Consumption: Never     Average Number of Drinks: Patient does not drink     Frequency of Binge Drinking: Never         Objective   Vital Signs:   Vitals:    06/10/25 0702   BP: 124/80   BP Location: Left arm   Patient Position: Sitting   Cuff Size: Adult   Pulse: 77   Temp: 97.3 °F (36.3 °C)   SpO2: 100%   Weight: 87 kg (191 lb 14.4 oz)   Height: 157.5 cm (62\")   PainSc: 0-No pain     Body mass index is 35.1 kg/m².    Wt Readings from Last 3 Encounters:   06/10/25 87 kg (191 lb 14.4 oz) (97%, Z= 1.82)*   03/26/25 81.6 kg (180 lb) (95%, Z= 1.62)*   03/04/25 80.6 kg (177 lb 11.2 oz) (94%, Z= 1.58)*     * Growth percentiles are based on CDC (Girls, 2-20 Years) data.     BP Readings from Last 3 Encounters:   06/10/25 124/80   03/26/25 115/61   03/04/25 112/74       Health Maintenance   Topic Date Due    DIABETIC EYE EXAM  Never done    MENINGOCOCCAL B VACCINE (1 of 2 - Standard) Never done    DIABETIC FOOT EXAM  05/15/2025    CHLAMYDIA SCREENING  05/23/2025    COVID-19 Vaccine (1 - 2024-25 season) 02/17/2026 (Originally 9/1/2024)    HPV VACCINES (2 - 2-dose series) 02/17/2026 (Originally 7/16/2019)    Pneumococcal Vaccine 0-49 (1 of 1 - PPSV23) 03/04/2026 (Originally 12/20/2011)    INFLUENZA VACCINE  07/01/2025    HEMOGLOBIN A1C  09/26/2025    URINE MICROALBUMIN-CREATININE RATIO (uACR)  02/17/2026    ANNUAL PHYSICAL  03/04/2026    TDAP/TD VACCINES (2 - Td or Tdap) 08/01/2034    HEPATITIS C SCREENING  Completed    Hepatitis B  Completed    MENINGOCOCCAL VACCINE  Aged Out       /80 (BP Location: Left arm, Patient Position: Sitting, Cuff Size: Adult)   Pulse 77   Temp 97.3 °F (36.3 °C)   Ht 157.5 cm (62\")   Wt 87 kg (191 lb 14.4 oz)   SpO2 100%   BMI 35.10 kg/m²       Current Outpatient Medications:     acetone, urine, test strip, Check urine ketones when BG level is > 200 x 2 or when sick., " Disp: , Rfl:     Continuous Glucose Sensor (Dexcom G7 Sensor) misc, Use 1 Application Every 10 (Ten) Days. Apply new sensor every 10 days., Disp: 3 each, Rfl: 6    FLUoxetine (PROzac) 10 MG capsule, Take 1 capsule by mouth Daily. Indications: Generalized Anxiety Disorder, Disp: 30 capsule, Rfl: 5    glipizide (GLUCOTROL) 10 MG tablet, Take 1 tablet by mouth Daily., Disp: 30 tablet, Rfl: 5    insulin aspart (novoLOG FLEXPEN) 100 UNIT/ML solution pen-injector sc pen, Variable dose up to 100 units per day; Insulin to carb ratio 1:8 with breakfast, lunch and dinner, Disp: 30 mL, Rfl: 5    Insulin Pen Needle 31G X 5 MM misc, For injecting insulin up to 5x a day, Disp: 250 each, Rfl: 9    OneTouch Verio test strip, Use to check blood sugar 7 times a day., Disp: 200 each, Rfl: 8    vitamin D3 (Dialyvite Vitamin D 5000) 125 MCG (5000 UT) capsule capsule, Take 1 capsule by mouth Daily. Indications: Vitamin D Deficiency, Disp: 30 capsule, Rfl: 5    phentermine 15 MG capsule, Take 1 capsule by mouth Every Morning. Indications: OBESITY, Disp: 30 capsule, Rfl: 0   Past Medical History:   Diagnosis Date    Anxiety     Chlamydia infection during pregnancy 2024    Diabetes     CLAUDIA    Monogenic diabetes 2024    CLAUDIA - managed by [facility] outside of pregnancy          delivery 2024     labor, Di Di twins, 30+5    Vitamin D deficiency         Physical Exam  Vitals reviewed.   Constitutional:       Appearance: Normal appearance. She is well-developed. She is obese.   Neck:      Thyroid: No thyroid mass, thyromegaly or thyroid tenderness.   Cardiovascular:      Rate and Rhythm: Normal rate and regular rhythm.      Heart sounds: No murmur heard.     No friction rub. No gallop.   Pulmonary:      Effort: Pulmonary effort is normal.      Breath sounds: Normal breath sounds. No wheezing or rhonchi.   Lymphadenopathy:      Cervical: No cervical adenopathy.   Skin:     General: Skin is warm and dry.    Neurological:      Mental Status: She is alert and oriented to person, place, and time.      Cranial Nerves: No cranial nerve deficit.   Psychiatric:         Mood and Affect: Mood and affect normal.         Behavior: Behavior normal.         Thought Content: Thought content normal. Thought content does not include homicidal or suicidal ideation.         Judgment: Judgment normal.       Physical Exam        Result Review :    The following data was reviewed by: SAM Cid on 06/10/2025:  Common Labs   Common labs          9/13/2024    14:27 2/17/2025    13:45 2/17/2025    13:50 3/26/2025    15:09   Common Labs   Glucose   69     BUN   9     Creatinine   0.73     Sodium   138     Potassium   4.1     Chloride   103     Calcium   9.8     Albumin   4.2     Total Bilirubin   0.3     Alkaline Phosphatase   74     AST (SGOT)   21     ALT (SGPT)   13     WBC 6.18   7.65     Hemoglobin 12.1   13.7     Hematocrit 39.7   43.5     Platelets 322   314     Total Cholesterol   127     Triglycerides   57     HDL Cholesterol   46     LDL Cholesterol    69     Hemoglobin A1C   6.70  6.0    Microalbumin, Urine  <1.2             No Images in the past 120 days found..    Results  Labs   - Blood Sugar Levels: Inconsistent, sometimes reaching into the 200s and 300s    Assessment & Plan  Class 2 severe obesity with serious comorbidity and body mass index (BMI) of 35.0 to 35.9 in adult, unspecified obesity type    Medication monitoring encounter    Orders:    POC Urine Drug Screen [facility]    POCT pregnancy, urine    CLAUDIA (maturity onset diabetes mellitus in young)       Assessment & Plan  1. Weight management.  - Insurance does not cover Wegoy, and she may not qualify for Ozempic due to maturity-onset diabetes.  - Gained 10 pounds despite a calorie deficit and daily workouts.  - Advised to increase daily caloric intake to at least 1200, ideally 1500, and ensure each meal contains a protein, carbohydrate, and healthy  fat.  - Urine drug screen and pregnancy test will be conducted today; started on a low dose of phentermine with a prescription sent to the pharmacy.    2. Maturity-onset diabetes.  - Blood sugar levels have been fluctuating significantly, reaching highs of 200-300 and dropping very low.  - Currently on insulin and glipizide.  - Advised to continue current medications and monitor blood sugar levels closely.  - Ensure meals are balanced with proteins, carbohydrates, and healthy fats to help manage blood sugar levels.    Follow-up  The patient will follow up in 4 weeks.       Diagnosis Plan   1. Class 2 severe obesity with serious comorbidity and body mass index (BMI) of 35.0 to 35.9 in adult, unspecified obesity type  phentermine 15 MG capsule      2. Medication monitoring encounter  POC Urine Drug Screen [facility]-C[facility]    POCT pregnancy, urine      3. CLAUDIA (maturity onset diabetes mellitus in young)              FOLLOW UP  Return in about 4 weeks (around 7/8/2025) for Next scheduled follow up wt loss mgmt.  Patient was given instructions and counseling regarding her condition or for health maintenance advice. Please see specific information pulled into the AVS if appropriate.       CURRENT & DISCONTINUED MEDICATIONS  Current Outpatient Medications   Medication Instructions    acetone, urine, test strip Check urine ketones when BG level is > 200 x 2 or when sick.    Continuous Glucose Sensor (Dexcom G7 Sensor) misc 1 Application, Not Applicable, Every 10 Days, Apply new sensor every 10 days.    FLUoxetine (PROZAC) 10 mg, Oral, Daily    glipizide (GLUCOTROL) 10 mg, Oral, Daily    insulin aspart (novoLOG FLEXPEN) 100 UNIT/ML solution pen-injector sc pen Variable dose up to 100 units per day; Insulin to carb ratio 1:8 with breakfast, lunch and dinner    Insulin Pen Needle 31G X 5 MM misc For injecting insulin up to 5x a day    OneTouch Verio test strip Use to check blood sugar 7 times a day.    phentermine 15 mg, Oral,  Every Morning    vitamin D3 (DIALYVITE VITAMIN D 5000) 5,000 Units, Oral, Daily       There are no discontinued medications.     Parts of this note are electronic transcriptions/translations of spoken language to printed text using the Dragon Dictation system.    SAM Cid  06/10/25  08:10 EDT

## 2025-06-10 ENCOUNTER — OFFICE VISIT (OUTPATIENT)
Dept: FAMILY MEDICINE CLINIC | Facility: CLINIC | Age: 20
End: 2025-06-10
Payer: COMMERCIAL

## 2025-06-10 VITALS
HEIGHT: 62 IN | TEMPERATURE: 97.3 F | WEIGHT: 191.9 LBS | HEART RATE: 77 BPM | OXYGEN SATURATION: 100 % | BODY MASS INDEX: 35.31 KG/M2 | SYSTOLIC BLOOD PRESSURE: 124 MMHG | DIASTOLIC BLOOD PRESSURE: 80 MMHG

## 2025-06-10 DIAGNOSIS — E66.812 CLASS 2 SEVERE OBESITY WITH SERIOUS COMORBIDITY AND BODY MASS INDEX (BMI) OF 35.0 TO 35.9 IN ADULT, UNSPECIFIED OBESITY TYPE: Primary | ICD-10-CM

## 2025-06-10 DIAGNOSIS — E66.01 CLASS 2 SEVERE OBESITY WITH SERIOUS COMORBIDITY AND BODY MASS INDEX (BMI) OF 35.0 TO 35.9 IN ADULT, UNSPECIFIED OBESITY TYPE: Primary | ICD-10-CM

## 2025-06-10 DIAGNOSIS — E13.9 MODY (MATURITY ONSET DIABETES MELLITUS IN YOUNG): ICD-10-CM

## 2025-06-10 DIAGNOSIS — Z51.81 MEDICATION MONITORING ENCOUNTER: ICD-10-CM

## 2025-06-10 LAB
AMPHET+METHAMPHET UR QL: NEGATIVE
AMPHETAMINE INTERNAL CONTROL: NORMAL
AMPHETAMINES UR QL: NEGATIVE
B-HCG UR QL: NEGATIVE
BARBITURATE INTERNAL CONTROL: NORMAL
BARBITURATES UR QL SCN: NEGATIVE
BENZODIAZ UR QL SCN: NEGATIVE
BENZODIAZEPINE INTERNAL CONTROL: NORMAL
BUPRENORPHINE INTERNAL CONTROL: NORMAL
BUPRENORPHINE SERPL-MCNC: NEGATIVE NG/ML
CANNABINOIDS SERPL QL: NEGATIVE
COCAINE INTERNAL CONTROL: NORMAL
COCAINE UR QL: NEGATIVE
EXPIRATION DATE: NORMAL
EXPIRATION DATE: NORMAL
INTERNAL NEGATIVE CONTROL: NORMAL
INTERNAL POSITIVE CONTROL: NORMAL
Lab: NORMAL
Lab: NORMAL
MDMA (ECSTASY) INTERNAL CONTROL: NORMAL
MDMA UR QL SCN: NEGATIVE
METHADONE INTERNAL CONTROL: NORMAL
METHADONE UR QL SCN: NEGATIVE
METHAMPHETAMINE INTERNAL CONTROL: NORMAL
OPIATES INTERNAL CONTROL: NORMAL
OPIATES UR QL: NEGATIVE
OXYCODONE INTERNAL CONTROL: NORMAL
OXYCODONE UR QL SCN: NEGATIVE
PCP UR QL SCN: NEGATIVE
PHENCYCLIDINE INTERNAL CONTROL: NORMAL
THC INTERNAL CONTROL: NORMAL

## 2025-06-10 RX ORDER — PHENTERMINE HYDROCHLORIDE 15 MG/1
15 CAPSULE ORAL EVERY MORNING
Qty: 30 CAPSULE | Refills: 0 | Status: SHIPPED | OUTPATIENT
Start: 2025-06-10

## 2025-06-10 NOTE — ASSESSMENT & PLAN NOTE
Patient's (Body mass index is 35.1 kg/m².) indicates that they are  with health conditions that include  . Weight is . BMI  . We discussed .

## 2025-06-10 NOTE — PATIENT INSTRUCTIONS
"Healthy Eating, Adult  Healthy eating may help you get and keep a healthy body weight, reduce the risk of chronic disease, and live a long and productive life. It is important to follow a healthy eating pattern. Your nutritional and calorie needs should be met mainly by different nutrient-rich foods.  What are tips for following this plan?  Reading food labels  Read labels and choose the following:  Reduced or low sodium products.  Juices with 100% fruit juice.  Foods with low saturated fats (<3 g per serving) and high polyunsaturated and monounsaturated fats.  Foods with whole grains, such as whole wheat, cracked wheat, brown rice, and wild rice.  Whole grains that are fortified with folic acid. This is recommended for females who are pregnant or who want to become pregnant.  Read labels and do not eat or drink the following:  Foods or drinks with added sugars. These include foods that contain brown sugar, corn sweetener, corn syrup, dextrose, fructose, glucose, high-fructose corn syrup, honey, invert sugar, lactose, malt syrup, maltose, molasses, raw sugar, sucrose, trehalose, or turbinado sugar.  Limit your intake of added sugars to less than 10% of your total daily calories. Do not eat more than the following amounts of added sugar per day:  6 teaspoons (25 g) for females.  9 teaspoons (38 g) for males.  Foods that contain processed or refined starches and grains.  Refined grain products, such as white flour, degermed cornmeal, white bread, and white rice.  Shopping  Choose nutrient-rich snacks, such as vegetables, whole fruits, and nuts. Avoid high-calorie and high-sugar snacks, such as potato chips, fruit snacks, and candy.  Use oil-based dressings and spreads on foods instead of solid fats such as butter, margarine, sour cream, or cream cheese.  Limit pre-made sauces, mixes, and \"instant\" products such as flavored rice, instant noodles, and ready-made pasta.  Try more plant-protein sources, such as tofu, " tempeh, black beans, edamame, lentils, nuts, and seeds.  Explore eating plans such as the Mediterranean diet or vegetarian diet.  Try heart-healthy dips made with beans and healthy fats like hummus and guacamole. Vegetables go great with these.  Cooking  Use oil to sauté or stir-ng foods instead of solid fats such as butter, margarine, or lard.  Try baking, boiling, grilling, or broiling instead of frying.  Remove the fatty part of meats before cooking.  Steam vegetables in water or broth.  Meal planning    At meals, imagine dividing your plate into fourths:  One-half of your plate is fruits and vegetables.  One-fourth of your plate is whole grains.  One-fourth of your plate is protein, especially lean meats, poultry, eggs, tofu, beans, or nuts.  Include low-fat dairy as part of your daily diet.  Lifestyle  Choose healthy options in all settings, including home, work, school, restaurants, or stores.  Prepare your food safely:  Wash your hands after handling raw meats.  Where you prepare food, keep surfaces clean by regularly washing with hot, soapy water.  Keep raw meats separate from ready-to-eat foods, such as fruits and vegetables.  , meat, poultry, and eggs to the recommended temperature. Get a food thermometer.  Store foods at safe temperatures. In general:  Keep cold foods at 40°F (4.4°C) or below.  Keep hot foods at 140°F (60°C) or above.  Keep your freezer at 0°F (-17.8°C) or below.  Foods are not safe to eat if they have been between the temperatures of °F (4.4-60°C) for more than 2 hours.  What foods should I eat?  Fruits  Aim to eat 1½-2½ cups of fresh, canned (in natural juice), or frozen fruits each day. One cup of fruit equals 1 small apple, 1 large banana, 8 large strawberries, 1 cup (237 g) canned fruit, ½ cup (82 g) dried fruit, or 1 cup (240 mL) 100% juice.  Vegetables  Aim to eat 2-4 cups of fresh and frozen vegetables each day, including different varieties and colors. One cup  of vegetables equals 1 cup (91 g) broccoli or cauliflower florets, 2 medium carrots, 2 cups (150 g) raw, leafy greens, 1 large tomato, 1 large villa pepper, 1 large sweet potato, or 1 medium white potato.  Grains  Aim to eat 5-10 ounce-equivalents of whole grains each day. Examples of 1 ounce-equivalent of grains include 1 slice of bread, 1 cup (40 g) ready-to-eat cereal, 3 cups (24 g) popcorn, or ½ cup (93 g) cooked rice.  Meats and other proteins  Try to eat 5-7 ounce-equivalents of protein each day. Examples of 1 ounce-equivalent of protein include 1 egg, ½ oz nuts (12 almonds, 24 pistachios, or 7 walnut halves), 1/4 cup (90 g) cooked beans, 6 tablespoons (90 g) hummus or 1 tablespoon (16 g) peanut butter. A cut of meat or fish that is the size of a deck of cards is about 3-4 ounce-equivalents (85 g).  Of the protein you eat each week, try to have at least 8 sounce (227 g) of seafood. This is about 2 servings per week. This includes salmon, trout, herring, sardines, and anchovies.  Dairy  Aim to eat 3 cup-equivalents of fat-free or low-fat dairy each day. Examples of 1 cup-equivalent of dairy include 1 cup (240 mL) milk, 8 ounces (250 g) yogurt, 1½ ounces (44 g) natural cheese, or 1 cup (240 mL) fortified soy milk.  Fats and oils  Aim for about 5 teaspoons (21 g) of fats and oils per day. Choose monounsaturated fats, such as canola and olive oils, mayonnaise made with olive oil or avocado oil, avocados, peanut butter, and most nuts, or polyunsaturated fats, such as sunflower, corn, and soybean oils, walnuts, pine nuts, sesame seeds, sunflower seeds, and flaxseed.  Beverages  Aim for 6 eight-ounce glasses of water per day. Limit coffee to 3-5 eight-ounce cups per day.  Limit caffeinated beverages that have added calories, such as soda and energy drinks.  If you drink alcohol:  Limit how much you have to:  0-1 drink a day if you are female.  0-2 drinks a day if you are male.  Know how much alcohol is in your drink.  In the [place] one drink is one 12 oz bottle of beer (355 mL), one 5 oz glass of wine (148 mL), or one 1½ oz glass of hard liquor (44 mL).  Seasoning and other foods  Try not to add too much salt to your food. Try using herbs and spices instead of salt.  Try not to add sugar to food.  This information is based on [place] nutrition guidelines. To learn more, visit [employer].gov. Exact amounts may vary. You may need different amounts.  This information is not intended to replace advice given to you by your health care provider. Make sure you discuss any questions you have with your health care provider.  Document Revised: 09/18/2023 Document Reviewed: 09/18/2023  Elsevier Patient Education © 2024 [employer]vier Inc.

## 2025-07-07 NOTE — PROGRESS NOTES
Chief Complaint  Weight Check    Patient or patient representative verbalized consent for the use of Ambient Listening during the visit with  SAM Cid for chart documentation. 7/8/2025  07:08 EDT    Subjective            Samantha Recinos is a 19 y.o. female who presents to [facility]   History of Present Illness  History of Present Illness  The patient is here today to follow up on weight loss management.    She has been taking phentermine 15 mg and reports no side effects. However, she feels that the medication is not sufficiently suppressing her appetite. She has made efforts to maintain a balanced diet during the holiday season. Her exercise routine includes 45 minutes of workout, five days a week. She does not consume sodas.  She has lost 4 pounds in the past month.    She is under the care of an OB/GYN at [facility] and will be due for a follow-up in August.     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  She follows with the diabetic clinic.  She had an eye examination in 05/2025 at [facility] in [facility]. She has not had a foot examination before.      Tobacco Use: Low Risk  (7/8/2025)    Patient History     Smoking Tobacco Use: Never     Smokeless Tobacco Use: Never     Passive Exposure: Never      E-cigarette/Vaping    E-cigarette/Vaping Use Never User     Passive Exposure No     Counseling Given No      E-cigarette/Vaping Substances    Nicotine No     THC No     CBD No     Flavoring No      E-cigarette/Vaping Devices    Disposable No     Pre-filled or Refillable Cartridge No     Refillable Tank No     Pre-filled Pod No        Alcohol Use: Not At Risk (8/5/2024)    AUDIT-C     Frequency of Alcohol Consumption: Never     Average Number of Drinks: Patient does not drink     Frequency of Binge Drinking: Never         Objective   Vital Signs:   Vitals:    07/08/25 0702   BP: 126/81   BP Location: Left arm   Patient Position: Sitting   Cuff Size: Adult  "  Pulse: 85   Temp: 96.8 °F (36 °C)   SpO2: 98%   Weight: 84.9 kg (187 lb 1.6 oz)   Height: 157.5 cm (62\")   PainSc: 0-No pain     Body mass index is 34.22 kg/m².    Wt Readings from Last 3 Encounters:   07/08/25 84.9 kg (187 lb 1.6 oz) (96%, Z= 1.74)*   06/10/25 87 kg (191 lb 14.4 oz) (97%, Z= 1.82)*   03/26/25 81.6 kg (180 lb) (95%, Z= 1.62)*     * Growth percentiles are based on [facility] (Girls, 2-20 Years) data.     BP Readings from Last 3 Encounters:   07/08/25 126/81   06/10/25 124/80   03/26/25 115/61       Health Maintenance   Topic Date Due    DIABETIC EYE EXAM  Never done    MENINGOCOCCAL B VACCINE (1 of 2 - Standard) Never done    HEMOGLOBIN A1C  09/26/2025    CHLAMYDIA SCREENING  08/04/2025 (Originally 5/23/2025)    COVID-19 Vaccine (1 - 2024-25 season) 02/17/2026 (Originally 9/1/2024)    HPV VACCINES (2 - 2-dose series) 02/17/2026 (Originally 7/16/2019)    Pneumococcal Vaccine 0-49 (1 of 1 - PPSV23) 03/04/2026 (Originally 12/20/2011)    INFLUENZA VACCINE  10/01/2025    URINE MICROALBUMIN-CREATININE RATIO (uACR)  02/17/2026    ANNUAL PHYSICAL  03/04/2026    DIABETIC FOOT EXAM  07/08/2026    TDAP/TD VACCINES (2 - Td or Tdap) 08/01/2034    HEPATITIS C SCREENING  Completed    Hepatitis B  Completed    MENINGOCOCCAL VACCINE  Aged Out       /81 (BP Location: Left arm, Patient Position: Sitting, Cuff Size: Adult)   Pulse 85   Temp 96.8 °F (36 °C)   Ht 157.5 cm (62\")   Wt 84.9 kg (187 lb 1.6 oz)   SpO2 98%   BMI 34.22 kg/m²       Current Outpatient Medications:     acetone, urine, test strip, Check urine ketones when BG level is > 200 x 2 or when sick., Disp: , Rfl:     Continuous Glucose Sensor (Dexcom G7 Sensor) misc, Use 1 Application Every 10 (Ten) Days. Apply new sensor every 10 days., Disp: 3 each, Rfl: 6    FLUoxetine (PROzac) 10 MG capsule, Take 1 capsule by mouth Daily. Indications: Generalized Anxiety Disorder, Disp: 30 capsule, Rfl: 5    glipizide (GLUCOTROL) 10 MG tablet, Take 1 tablet by " mouth Daily., Disp: 30 tablet, Rfl: 5    insulin aspart (novoLOG FLEXPEN) 100 UNIT/ML solution pen-injector sc pen, Variable dose up to 100 units per day; Insulin to carb ratio 1:8 with breakfast, lunch and dinner, Disp: 30 mL, Rfl: 5    Insulin Pen Needle 31G X 5 MM misc, For injecting insulin up to 5x a day, Disp: 250 each, Rfl: 9    OneTouch Verio test strip, Use to check blood sugar 7 times a day., Disp: 200 each, Rfl: 8    phentermine 30 MG capsule, Take 1 capsule by mouth Every Morning. Indications: OBESITY, Disp: 30 capsule, Rfl: 0    vitamin D3 (Dialyvite Vitamin D 5000) 125 MCG (5000 UT) capsule capsule, Take 1 capsule by mouth Daily. Indications: Vitamin D Deficiency, Disp: 30 capsule, Rfl: 5   Past Medical History:   Diagnosis Date    Anxiety     Chlamydia infection during pregnancy 2024    Diabetes     CLAUDIA    Monogenic diabetes 2024    CLAUDIA - managed by [facility] outside of pregnancy          delivery 2024     labor, Di Di twins, 30+5    Vitamin D deficiency         Physical Exam  Vitals reviewed.   Constitutional:       Appearance: Normal appearance. She is well-developed.   Neck:      Thyroid: No thyroid mass, thyromegaly or thyroid tenderness.   Cardiovascular:      Rate and Rhythm: Normal rate and regular rhythm.      Pulses:           Dorsalis pedis pulses are 2+ on the right side and 2+ on the left side.      Heart sounds: No murmur heard.     No friction rub. No gallop.   Pulmonary:      Effort: Pulmonary effort is normal.      Breath sounds: Normal breath sounds. No wheezing or rhonchi.   Feet:      Right foot:      Protective Sensation: 9 sites tested.  9 sites sensed.      Skin integrity: Skin integrity normal. No ulcer or blister.      Toenail Condition: Right toenails are normal.      Left foot:      Protective Sensation: 9 sites tested.  9 sites sensed.      Skin integrity: Skin integrity normal. No ulcer or blister.      Toenail Condition: Left  toenails are normal.      Comments: Diabetic Foot Exam Performed and Monofilament Test Performed     Lymphadenopathy:      Cervical: No cervical adenopathy.   Skin:     General: Skin is warm and dry.   Neurological:      Mental Status: She is alert and oriented to person, place, and time.      Cranial Nerves: No cranial nerve deficit.   Psychiatric:         Mood and Affect: Mood and affect normal.         Behavior: Behavior normal.         Thought Content: Thought content normal. Thought content does not include homicidal or suicidal ideation.         Judgment: Judgment normal.       Physical Exam        Result Review :    The following data was reviewed by: SAM Cid on 07/08/2025:    Results      Assessment & Plan  Class 1 obesity with body mass index (BMI) of 34.0 to 34.9 in adult, unspecified obesity type, unspecified whether serious comorbidity present    Orders:    phentermine 30 MG capsule; Take 1 capsule by mouth Every Morning. Indications: OBESITY    CLAUDIA (maturity onset diabetes mellitus in young)    Encounter for diabetic foot exam      Assessment & Plan  1. Weight loss management.  - Lost 4 pounds this month on phentermine 15 mg.  - BMI has decreased to 34.  - Phentermine dosage will be increased to 30 mg due to insufficient appetite suppression.  - Advised to maintain hydration, limit soda intake, consume a balanced diet rich in proteins, vegetables, and low carbohydrates, and exercise for at least 30 minutes, five days a week.  - Handout on increasing physical activity provided, see AVS.    2. Diabetes Mellitus.  - Diabetic foot exam performed today; feet are in good condition.  - Advised to check feet regularly for cuts or sores, wash between toes, and trim toenails straight across to prevent ulcers.  - Handout on footcare for diabetics provided, see AVS.    3. Health Maintenance.  - Had an [facility] exam in [facility].  - Advised to schedule an appointment with OB/GYN  for a checkup and potential Chlamydia screening, as it has been almost a year since the last visit.    Follow-up  - A follow-up visit is scheduled in 1 month for weight loss management.         Diagnosis Plan   1. Class 1 obesity with body mass index (BMI) of 34.0 to 34.9 in adult, unspecified obesity type, unspecified whether serious comorbidity present  phentermine 30 MG capsule      2. CLAUDIA (maturity onset diabetes mellitus in young)        3. Encounter for diabetic foot exam              FOLLOW UP  Return in about 4 weeks (around 8/5/2025) for Recheck weight loss mgmt.  Patient was given instructions and counseling regarding her condition or for health maintenance advice. Please see specific information pulled into the AVS if appropriate.       CURRENT & DISCONTINUED MEDICATIONS  Current Outpatient Medications   Medication Instructions    acetone, urine, test strip Check urine ketones when BG level is > 200 x 2 or when sick.    Continuous Glucose Sensor (Dexcom G7 Sensor) misc 1 Application, Not Applicable, Every 10 Days, Apply new sensor every 10 days.    FLUoxetine (PROZAC) 10 mg, Oral, Daily    glipizide (GLUCOTROL) 10 mg, Oral, Daily    insulin aspart (novoLOG FLEXPEN) 100 UNIT/ML solution pen-injector sc pen Variable dose up to 100 units per day; Insulin to carb ratio 1:8 with breakfast, lunch and dinner    Insulin Pen Needle 31G X 5 MM misc For injecting insulin up to 5x a day    OneTouch Verio test strip Use to check blood sugar 7 times a day.    phentermine 30 mg, Oral, Every Morning    vitamin D3 (DIALYVITE VITAMIN D 5000) 5,000 Units, Oral, Daily       Medications Discontinued During This Encounter   Medication Reason    phentermine 15 MG capsule Reorder        Parts of this note are electronic transcriptions/translations of spoken language to printed text using the Dragon Dictation system.    Bethany Buchanan, SAM  07/08/25  07:22 EDT

## 2025-07-08 ENCOUNTER — OFFICE VISIT (OUTPATIENT)
Dept: FAMILY MEDICINE CLINIC | Facility: CLINIC | Age: 20
End: 2025-07-08
Payer: COMMERCIAL

## 2025-07-08 VITALS
WEIGHT: 187.1 LBS | OXYGEN SATURATION: 98 % | BODY MASS INDEX: 34.43 KG/M2 | HEART RATE: 85 BPM | SYSTOLIC BLOOD PRESSURE: 126 MMHG | DIASTOLIC BLOOD PRESSURE: 81 MMHG | TEMPERATURE: 96.8 F | HEIGHT: 62 IN

## 2025-07-08 DIAGNOSIS — E66.811 CLASS 1 OBESITY WITH BODY MASS INDEX (BMI) OF 34.0 TO 34.9 IN ADULT, UNSPECIFIED OBESITY TYPE, UNSPECIFIED WHETHER SERIOUS COMORBIDITY PRESENT: Primary | ICD-10-CM

## 2025-07-08 DIAGNOSIS — E13.9 MODY (MATURITY ONSET DIABETES MELLITUS IN YOUNG): ICD-10-CM

## 2025-07-08 DIAGNOSIS — E11.9 ENCOUNTER FOR DIABETIC FOOT EXAM: ICD-10-CM

## 2025-07-08 PROCEDURE — 1126F AMNT PAIN NOTED NONE PRSNT: CPT | Performed by: NURSE PRACTITIONER

## 2025-07-08 PROCEDURE — 3044F HG A1C LEVEL LT 7.0%: CPT | Performed by: NURSE PRACTITIONER

## 2025-07-08 PROCEDURE — 1159F MED LIST DOCD IN RCRD: CPT | Performed by: NURSE PRACTITIONER

## 2025-07-08 PROCEDURE — 1160F RVW MEDS BY RX/DR IN RCRD: CPT | Performed by: NURSE PRACTITIONER

## 2025-07-08 PROCEDURE — 99214 OFFICE O/P EST MOD 30 MIN: CPT | Performed by: NURSE PRACTITIONER

## 2025-07-08 RX ORDER — PHENTERMINE HYDROCHLORIDE 30 MG/1
30 CAPSULE ORAL EVERY MORNING
Qty: 30 CAPSULE | Refills: 0 | Status: SHIPPED | OUTPATIENT
Start: 2025-07-08

## 2025-07-08 NOTE — ASSESSMENT & PLAN NOTE
Orders:    phentermine 30 MG capsule; Take 1 capsule by mouth Every Morning. Indications: OBESITY

## 2025-08-05 ENCOUNTER — OFFICE VISIT (OUTPATIENT)
Dept: FAMILY MEDICINE CLINIC | Facility: CLINIC | Age: 20
End: 2025-08-05
Payer: COMMERCIAL

## 2025-08-05 VITALS
HEART RATE: 87 BPM | SYSTOLIC BLOOD PRESSURE: 124 MMHG | DIASTOLIC BLOOD PRESSURE: 82 MMHG | BODY MASS INDEX: 33.07 KG/M2 | WEIGHT: 179.7 LBS | OXYGEN SATURATION: 98 % | HEIGHT: 62 IN | TEMPERATURE: 97.3 F

## 2025-08-05 DIAGNOSIS — Z30.011 ENCOUNTER FOR INITIAL PRESCRIPTION OF CONTRACEPTIVE PILLS: ICD-10-CM

## 2025-08-05 DIAGNOSIS — R45.4 IRRITABILITY AND ANGER: ICD-10-CM

## 2025-08-05 DIAGNOSIS — E13.9 MODY (MATURITY ONSET DIABETES MELLITUS IN YOUNG): ICD-10-CM

## 2025-08-05 DIAGNOSIS — F41.1 GAD (GENERALIZED ANXIETY DISORDER): ICD-10-CM

## 2025-08-05 DIAGNOSIS — E55.9 VITAMIN D DEFICIENCY: ICD-10-CM

## 2025-08-05 DIAGNOSIS — Z23 NEED FOR VACCINATION: ICD-10-CM

## 2025-08-05 DIAGNOSIS — E66.811 CLASS 1 OBESITY WITH SERIOUS COMORBIDITY AND BODY MASS INDEX (BMI) OF 32.0 TO 32.9 IN ADULT, UNSPECIFIED OBESITY TYPE: Primary | ICD-10-CM

## 2025-08-05 LAB
25(OH)D3 SERPL-MCNC: 51.2 NG/ML (ref 30–100)
ALBUMIN SERPL-MCNC: 4.3 G/DL (ref 3.5–5.2)
ALBUMIN/GLOB SERPL: 1.3 G/DL
ALP SERPL-CCNC: 65 U/L (ref 39–117)
ALT SERPL W P-5'-P-CCNC: 18 U/L (ref 1–33)
ANION GAP SERPL CALCULATED.3IONS-SCNC: 11.9 MMOL/L (ref 5–15)
AST SERPL-CCNC: 20 U/L (ref 1–32)
BILIRUB SERPL-MCNC: 0.3 MG/DL (ref 0–1.2)
BUN SERPL-MCNC: 7 MG/DL (ref 6–20)
BUN/CREAT SERPL: 10.3 (ref 7–25)
CALCIUM SPEC-SCNC: 9.7 MG/DL (ref 8.6–10.5)
CHLORIDE SERPL-SCNC: 104 MMOL/L (ref 98–107)
CO2 SERPL-SCNC: 25.1 MMOL/L (ref 22–29)
CREAT SERPL-MCNC: 0.68 MG/DL (ref 0.57–1)
EGFRCR SERPLBLD CKD-EPI 2021: 128.8 ML/MIN/1.73
GLOBULIN UR ELPH-MCNC: 3.4 GM/DL
GLUCOSE SERPL-MCNC: 106 MG/DL (ref 65–99)
HBA1C MFR BLD: 6.4 % (ref 4.8–5.6)
POTASSIUM SERPL-SCNC: 4.4 MMOL/L (ref 3.5–5.2)
PROT SERPL-MCNC: 7.7 G/DL (ref 6–8.5)
SODIUM SERPL-SCNC: 141 MMOL/L (ref 136–145)

## 2025-08-05 PROCEDURE — 1126F AMNT PAIN NOTED NONE PRSNT: CPT | Performed by: NURSE PRACTITIONER

## 2025-08-05 PROCEDURE — 99214 OFFICE O/P EST MOD 30 MIN: CPT | Performed by: NURSE PRACTITIONER

## 2025-08-05 PROCEDURE — 90651 9VHPV VACCINE 2/3 DOSE IM: CPT | Performed by: NURSE PRACTITIONER

## 2025-08-05 PROCEDURE — 3044F HG A1C LEVEL LT 7.0%: CPT | Performed by: NURSE PRACTITIONER

## 2025-08-05 PROCEDURE — 80053 COMPREHEN METABOLIC PANEL: CPT | Performed by: NURSE PRACTITIONER

## 2025-08-05 PROCEDURE — 1160F RVW MEDS BY RX/DR IN RCRD: CPT | Performed by: NURSE PRACTITIONER

## 2025-08-05 PROCEDURE — 90471 IMMUNIZATION ADMIN: CPT | Performed by: NURSE PRACTITIONER

## 2025-08-05 PROCEDURE — 83036 HEMOGLOBIN GLYCOSYLATED A1C: CPT | Performed by: NURSE PRACTITIONER

## 2025-08-05 PROCEDURE — 82306 VITAMIN D 25 HYDROXY: CPT | Performed by: NURSE PRACTITIONER

## 2025-08-05 PROCEDURE — 1159F MED LIST DOCD IN RCRD: CPT | Performed by: NURSE PRACTITIONER

## 2025-08-05 RX ORDER — NORETHINDRONE ACETATE AND ETHINYL ESTRADIOL 1MG-20(21)
1 KIT ORAL DAILY
Qty: 28 TABLET | Refills: 12 | Status: SHIPPED | OUTPATIENT
Start: 2025-08-05 | End: 2026-08-05

## 2025-08-05 RX ORDER — PHENTERMINE HYDROCHLORIDE 30 MG/1
30 CAPSULE ORAL EVERY MORNING
Qty: 30 CAPSULE | Refills: 0 | Status: SHIPPED | OUTPATIENT
Start: 2025-08-05

## 2025-08-06 ENCOUNTER — OFFICE VISIT (OUTPATIENT)
Dept: DIABETES SERVICES | Facility: HOSPITAL | Age: 20
End: 2025-08-06
Payer: COMMERCIAL

## 2025-08-06 VITALS
HEART RATE: 80 BPM | BODY MASS INDEX: 33.13 KG/M2 | HEIGHT: 62 IN | SYSTOLIC BLOOD PRESSURE: 121 MMHG | WEIGHT: 180 LBS | OXYGEN SATURATION: 100 % | DIASTOLIC BLOOD PRESSURE: 75 MMHG

## 2025-08-06 DIAGNOSIS — Z97.8 USES SELF-APPLIED CONTINUOUS GLUCOSE MONITORING DEVICE: ICD-10-CM

## 2025-08-06 DIAGNOSIS — E66.811 CLASS 1 OBESITY DUE TO EXCESS CALORIES WITHOUT SERIOUS COMORBIDITY WITH BODY MASS INDEX (BMI) OF 32.0 TO 32.9 IN ADULT: ICD-10-CM

## 2025-08-06 DIAGNOSIS — R73.09 HEMOGLOBIN A1C LESS THAN 7.0%: ICD-10-CM

## 2025-08-06 DIAGNOSIS — E66.09 CLASS 1 OBESITY DUE TO EXCESS CALORIES WITHOUT SERIOUS COMORBIDITY WITH BODY MASS INDEX (BMI) OF 32.0 TO 32.9 IN ADULT: ICD-10-CM

## 2025-08-06 DIAGNOSIS — E11.65 TYPE 2 DIABETES MELLITUS WITH HYPERGLYCEMIA, WITH LONG-TERM CURRENT USE OF INSULIN: Primary | ICD-10-CM

## 2025-08-06 DIAGNOSIS — Z79.4 TYPE 2 DIABETES MELLITUS WITH HYPERGLYCEMIA, WITH LONG-TERM CURRENT USE OF INSULIN: Primary | ICD-10-CM

## 2025-08-06 LAB — GLUCOSE BLDC GLUCOMTR-MCNC: 88 MG/DL (ref 70–99)

## 2025-08-06 PROCEDURE — 82948 REAGENT STRIP/BLOOD GLUCOSE: CPT

## 2025-08-06 PROCEDURE — 1159F MED LIST DOCD IN RCRD: CPT

## 2025-08-06 PROCEDURE — 95251 CONT GLUC MNTR ANALYSIS I&R: CPT

## 2025-08-06 PROCEDURE — 3044F HG A1C LEVEL LT 7.0%: CPT

## 2025-08-06 PROCEDURE — 1160F RVW MEDS BY RX/DR IN RCRD: CPT

## 2025-08-06 PROCEDURE — G0463 HOSPITAL OUTPT CLINIC VISIT: HCPCS

## 2025-08-06 PROCEDURE — 99214 OFFICE O/P EST MOD 30 MIN: CPT

## 2025-08-06 RX ORDER — DULAGLUTIDE 0.75 MG/.5ML
0.75 INJECTION, SOLUTION SUBCUTANEOUS
Qty: 2 ML | Refills: 1 | Status: SHIPPED | OUTPATIENT
Start: 2025-08-06

## 2025-08-06 RX ORDER — AVOBENZONE, HOMOSALATE, OCTISALATE, OCTOCRYLENE 30; 40; 45; 26 MG/ML; MG/ML; MG/ML; MG/ML
CREAM TOPICAL
Qty: 100 EACH | Refills: 1 | Status: SHIPPED | OUTPATIENT
Start: 2025-08-06

## 2025-08-21 DIAGNOSIS — E55.9 VITAMIN D DEFICIENCY: ICD-10-CM

## 2025-08-25 ENCOUNTER — PRIOR AUTHORIZATION (OUTPATIENT)
Dept: DIABETES SERVICES | Facility: HOSPITAL | Age: 20
End: 2025-08-25
Payer: COMMERCIAL

## (undated) DEVICE — TRY CATH FOL ADVANCE SIL W/BAG 16F

## (undated) DEVICE — SUT VIC 3/0 CT1 27IN DYED J338H

## (undated) DEVICE — CVR HNDL LT SURG ACCSSRY BLU STRL

## (undated) DEVICE — SUT VIC 4/0 KS 27IN VCP662H

## (undated) DEVICE — GLV SURG SENSICARE PI ORTHO PF SZ7 LF STRL

## (undated) DEVICE — INTENDED FOR TISSUE SEPARATION, AND OTHER PROCEDURES THAT REQUIRE A SHARP SURGICAL BLADE TO PUNCTURE OR CUT.: Brand: BARD-PARKER ® CARBON RIB-BACK BLADES

## (undated) DEVICE — PAD GRND REM POLYHESIVE A/ DISP

## (undated) DEVICE — DEV TRANSF BLD W/LUER ADPT CA/198

## (undated) DEVICE — NEEDLE,18GX1.5",REG,BEVEL: Brand: MEDLINE

## (undated) DEVICE — SUT MNCRYL 0/0 CTX 36IN Y398H

## (undated) DEVICE — C SECTION PACK: Brand: MEDLINE INDUSTRIES, INC.

## (undated) DEVICE — VIOLET BRAIDED (POLYGLACTIN 910), SYNTHETIC ABSORBABLE SUTURE: Brand: COATED VICRYL